# Patient Record
Sex: MALE | Race: WHITE | Employment: UNEMPLOYED | URBAN - METROPOLITAN AREA
[De-identification: names, ages, dates, MRNs, and addresses within clinical notes are randomized per-mention and may not be internally consistent; named-entity substitution may affect disease eponyms.]

---

## 2019-01-03 ENCOUNTER — OFFICE VISIT (OUTPATIENT)
Dept: FAMILY MEDICINE CLINIC | Facility: CLINIC | Age: 49
End: 2019-01-03

## 2019-01-03 VITALS
RESPIRATION RATE: 16 BRPM | WEIGHT: 247 LBS | DIASTOLIC BLOOD PRESSURE: 104 MMHG | HEART RATE: 75 BPM | SYSTOLIC BLOOD PRESSURE: 171 MMHG | BODY MASS INDEX: 36.58 KG/M2 | OXYGEN SATURATION: 96 % | HEIGHT: 69 IN | TEMPERATURE: 95.9 F

## 2019-01-03 DIAGNOSIS — M13.0 POLYARTICULAR ARTHRITIS: ICD-10-CM

## 2019-01-03 DIAGNOSIS — M79.672 PAIN IN BOTH FEET: ICD-10-CM

## 2019-01-03 DIAGNOSIS — M79.671 PAIN IN BOTH FEET: ICD-10-CM

## 2019-01-03 DIAGNOSIS — I10 ESSENTIAL HYPERTENSION: ICD-10-CM

## 2019-01-03 DIAGNOSIS — L40.9 PSORIASIS: ICD-10-CM

## 2019-01-03 DIAGNOSIS — F17.200 CURRENT SMOKER: Primary | ICD-10-CM

## 2019-01-03 PROBLEM — E66.01 SEVERE OBESITY (HCC): Status: ACTIVE | Noted: 2019-01-03

## 2019-01-03 RX ORDER — MELOXICAM 7.5 MG/1
TABLET ORAL
Qty: 60 TAB | Refills: 2 | Status: SHIPPED | OUTPATIENT
Start: 2019-01-03 | End: 2021-04-26 | Stop reason: ALTCHOICE

## 2019-01-03 NOTE — PROGRESS NOTES
HISTORY OF PRESENT ILLNESS  Antolin Pitt is a 50 y.o. male. He presents to establish care for joint pain and psoriasis  . New Patient   Pertinent negatives include no chest pain, no abdominal pain, no headaches and no shortness of breath. Foot Pain   The history is provided by the patient (This is a 50year old male who presents with bilateral heel pain onset one year prior  Also noted on the top of the foot and knees. The pain is constant worse when walking on the area. No trouble urinating. He is also now having hip described as aching whe). This is a chronic (The patient has not seen a doctor for over a decade. He came in once he became insured, he states) problem. The problem occurs constantly. The problem has been gradually worsening. Pertinent negatives include no chest pain, no abdominal pain, no headaches and no shortness of breath. The symptoms are aggravated by walking, standing and twisting. Nothing relieves the symptoms. Skin Problem   The history is provided by the patient (Psoriatic arthritis. History of prior koebner phenomenon in areas of damage). This is a chronic problem. The problem occurs constantly. Pertinent negatives include no chest pain, no abdominal pain, no headaches and no shortness of breath. Nothing (Not on treatment at this time) aggravates the symptoms. Nothing relieves the symptoms. Hypertension    History provided by: Elevated blood pressure. The patient was unaware of this finding. This is a new problem. Associated symptoms include anxiety (pressured speech ). Pertinent negatives include no chest pain, no blurred vision, no headaches, no tinnitus, no dizziness and no shortness of breath. Agents associated with hypertension include NSAIDs. Risk factors include smoking/tobacco exposure.        Review of Systems   Constitutional:        The patient denies any fever, chills, night sweats or weight loss  There has been no diaphoresis malaise or weakness   HENT: Negative for congestion, ear pain, hearing loss, sore throat and tinnitus. Eyes: Negative for blurred vision, double vision, photophobia and pain. Respiratory: Negative for sputum production and shortness of breath. The patient denies any shortness of breath at rest  There is no dyspnea on exertion  The patient denies any cough, or wheezing   Cardiovascular: Negative for chest pain. The patient denies any chest pain pressure or palpitations  There is no history of orthopnea or PND. The patient denies chest pain or dyspnea on exertion  There is no history of leg pain on walking  There is no history of pallor   Gastrointestinal: Negative for abdominal pain, blood in stool and melena. There is no history of nausea  The patient denies vomiting  There is no history of diarrhea or constipation  The patient denies heartburn     Genitourinary: Positive for dysuria (mild urinary retention). Musculoskeletal: Positive for back pain, joint pain and myalgias. Negative for falls. Skin: Positive for itching and rash. Neurological: Negative for dizziness, tingling, speech change, seizures and headaches. The patient denies numbness, tingling or other changes in sensation  There is no history of focal weakness, paresis or paralysis  There is no history of LOC    Endo/Heme/Allergies: Negative for polydipsia. Psychiatric/Behavioral: Positive for depression. Negative for substance abuse and suicidal ideas. The patient is nervous/anxious. The patient does not have insomnia. Physical Exam   Constitutional: He is oriented to person, place, and time. He appears well-developed and well-nourished. HENT:   Head: Normocephalic and atraumatic. Right Ear: External ear normal.   Left Ear: External ear normal.   Nose: Nose normal.   Mouth/Throat: Oropharynx is clear and moist.   Eyes: Conjunctivae and EOM are normal. Pupils are equal, round, and reactive to light.  Right eye exhibits no discharge. No scleral icterus. Neck: Normal range of motion. Neck supple. No JVD present. No thyromegaly present. Cardiovascular: Normal rate, regular rhythm and intact distal pulses. Exam reveals no gallop and no friction rub. No murmur heard. Pulmonary/Chest: No respiratory distress. He has no wheezes. He has no rales. He exhibits no tenderness. Abdominal: He exhibits no distension and no mass. There is no tenderness. There is no rebound and no guarding. Musculoskeletal: Normal range of motion. He exhibits no edema, tenderness or deformity. Lymphadenopathy:     He has no cervical adenopathy. Neurological: He is alert and oriented to person, place, and time. No cranial nerve deficit. He exhibits normal muscle tone. Coordination normal.   Skin: Skin is warm and dry. Rash noted. There is erythema. Diffuse psoriasis like plaques, on the trunk and extremities  Evidence of damage right forearm  Pitted nails   Psychiatric: His behavior is normal. Judgment and thought content normal.   Anxious. Pressured speech , repeats   Nursing note and vitals reviewed. ASSESSMENT and PLAN    ICD-10-CM ICD-9-CM    1. Current smokerChronic F17.200 305.1     Advised to stop. Shared literature with him   2. Pain in both feet M79.671 729.5 CBC WITH AUTOMATED DIFF    S07.488  METABOLIC PANEL, BASIC      SED RATE (ESR)      RHEUMATOID FACTOR, FLUID      CRP, HIGH SENSITIVITY      RADHA COMPREHENSIVE PANEL      RHEUMASSURE    Chronic problem. not improving  DDx DJD,psoriatic or other connective tissue problem, [place]ers, other  Diagnostic workup initiated  Mobic for now for pain    3. Polyarticular arthritisChronic M13.0 716.50 CBC WITH AUTOMATED DIFF      METABOLIC PANEL, BASIC      SED RATE (ESR)      RHEUMATOID FACTOR, FLUID      CRP, HIGH SENSITIVITY      RADHA COMPREHENSIVE PANEL      RHEUMASSURE    Workup initiated,may require DMARD   may need specialist referral   4.  Essential hypertension I10 401.9     Patient unaware of prior elevated readings  Repeat in 2 weeks prior to deciding on medication  administration   5. PsoriasisChronic L40.9 696.1 CBC WITH AUTOMATED DIFF      METABOLIC PANEL, BASIC      SED RATE (ESR)      RHEUMATOID FACTOR, FLUID      CRP, HIGH SENSITIVITY      RADHA COMPREHENSIVE PANEL      RHEUMASSURE    Widespread Once ready will have him use petrolium jelly post bath  and start with a steroid cream.   Other treatments may be needed. Follow-up Disposition:  Return in about 1 month (around 2/3/2019), or if symptoms worsen or fail to improve, for Have BP checked in 2 weeks here. lab results and schedule of future lab studies reviewed with patient  very strongly urged to quit smoking to reduce cardiovascular risk  reviewed medications and side effects in detail  Labs have been ordered. Mobic ordered  May need other meds for the arthritis  Will need treatment for the psoriasis once labs have returned

## 2019-01-03 NOTE — PATIENT INSTRUCTIONS
Capsaicin (On the skin)   Capsaicin (florian-SAY-sin)  Helps relieve muscle, joint, and nerve pain. Brand Name(s): Arthricare For Women, Arthricare For Women Silky Dry, Arthritis Pain Relieving Cream, Castiva Arthritis Pain Relief, DermaSilkRx Anodynexa Flaco, DermaSilkRx [employer], DermacinRx [facility], DermacinRx Lexitral PharmaPak, Diclofex DC, Inflammacin, Metaxall CP, NuDiclo SoluPAK, NuDiclo TabPAK, PrevidolRx Analgesic Flaco, PrevidolRx Plus Analgesic Flaco   There may be other brand names for this medicine. When This Medicine Should Not Be Used: This medicine is not right for everyone. Do not use it if you had an allergic reaction to capsaicin. How to Use This Medicine:   Cream, Thin Sheet, Gel/Jelly, Liquid, Lotion, Ointment, Pad, Patch, Stick  · Use this medicine only on your skin. Rinse it off right away if it gets on a cut or scrape. Do not get the medicine in your eyes, nose, or mouth. · Cream, lotion, ointment, gel, or liquid:   ¨ Follow the instructions on the medicine label if you are using this medicine without a prescription. ¨ Wash your hands with soap and water before and after you use this medicine. If you are using capsaicin for arthritis in your hands, do not wash your hands for at least 30 minutes after you apply it. Do not touch sensitive areas such as your eyes or mouth while the medicine is on your hands. ¨ Shake the lotion bottle well before use. ¨ Apply a small amount of the cream, lotion, ointment, gel, or liquid medicine over the affected area. Rub it in until you cannot see any medicine on your skin. ¨ You may feel burning or stinging each time you rub in the medicine. This may last for 2 to 4 weeks. Continue to use full doses. The burning sensation will not improve or go away if you reduce the number of doses you use each day. ¨ If a bandage is being used on the treated area, do not wrap it tightly.   · Gurinder Slater patch:   ¨ A nurse or other trained health professional will give you this medicine. ¨ Use the patch as directed. Do not touch the patch while it is on your skin. · Missed dose: Apply a dose as soon as you can. If it is almost time for your next dose, wait until then and apply a regular dose. Do not apply extra medicine to make up for a missed dose. · Store the medicine in a closed container at room temperature, away from heat, moisture, and direct light. Drugs and Foods to Avoid:   Ask your doctor or pharmacist before using any other medicine, including over-the-counter medicines, vitamins, and herbal products. · Talk to your doctor before you use any other skin medicine on the same area where you are using capsaicin. Warnings While Using This Medicine:   · Tell your doctor if you are pregnant or breastfeeding, or if you have high blood pressure or a history of heart or blood vessel problems. · Do not use the medicine on open wounds, sores, scrapes, or irritated skin. · This medicine may make your skin more sensitive to sunlight. Wear sunscreen. Do not use sunlamps or tanning beds. · Do not use this medicine to treat a skin problem your doctor has not examined. · Your doctor will check your progress and the effects of this medicine at regular visits. Keep all appointments. Your blood pressure will be measured while the patch is on your skin and after it has been removed. If you check your blood pressure at home and notice any changes, call your doctor right away. · You may need to use the medicine for 2 weeks or more before it relieves your pain. Keep using the medicine every day. If the medicine has not helped after a month, or if your pain becomes worse after a week, talk with your doctor. · Keep all medicine out of the reach of children. Never share your medicine with anyone.   Possible Side Effects While Using This Medicine:   Call your doctor right away if you notice any of these side effects:  · Allergic reaction: Itching or hives, swelling in your face or hands, swelling or tingling in your mouth or throat, chest tightness, trouble breathing  · Burning or stinging sensation  · Coughing, shortness of breath, or any breathing problems  · Fast, pounding, or uneven heartbeat  · Severe skin irritation, pain, redness, or swelling  If you notice these less serious side effects, talk with your doctor:   · Nausea or vomiting  · Skin itching or dryness  If you notice other side effects that you think are caused by this medicine, tell your doctor. Call your doctor for medical advice about side effects. You may report side effects to FDA at 0-556-BMV-4491  © 2017 [facility] Information is for End User's use only and may not be sold, redistributed or otherwise used for commercial purposes. The above information is an  only. It is not intended as medical advice for individual conditions or treatments. Talk to your doctor, nurse or pharmacist before following any medical regimen to see if it is safe and effective for you. Capsaicin (On the skin)   Capsaicin (florian-SAY-sin)  Helps relieve muscle, joint, and nerve pain. Brand Name(s): Arthricare For Women, Arthricare For Women Silky Dry, Arthritis Pain Relieving Cream, Castiva Arthritis Pain Relief, DermaSilkRx Anodynexa Flaco, DermaSilkRx [employer], DermacinRx [facility], DermacinRx Lexitral PharmaPak, Diclofex DC, Inflammacin, Metaxall CP, NuDiclo SoluPAK, NuDiclo TabPAK, PrevidolRx Analgesic Flaco, PrevidolRx Plus Analgesic Flaco   There may be other brand names for this medicine. When This Medicine Should Not Be Used: This medicine is not right for everyone. Do not use it if you had an allergic reaction to capsaicin. How to Use This Medicine:   Cream, Thin Sheet, Gel/Jelly, Liquid, Lotion, Ointment, Pad, Patch, Stick  · Use this medicine only on your skin. Rinse it off right away if it gets on a cut or scrape. Do not get the medicine in your eyes, nose, or mouth.   · Cream, lotion, ointment, gel, or liquid:   ¨ Follow the instructions on the medicine label if you are using this medicine without a prescription. ¨ Wash your hands with soap and water before and after you use this medicine. If you are using capsaicin for arthritis in your hands, do not wash your hands for at least 30 minutes after you apply it. Do not touch sensitive areas such as your eyes or mouth while the medicine is on your hands. ¨ Shake the lotion bottle well before use. ¨ Apply a small amount of the cream, lotion, ointment, gel, or liquid medicine over the affected area. Rub it in until you cannot see any medicine on your skin. ¨ You may feel burning or stinging each time you rub in the medicine. This may last for 2 to 4 weeks. Continue to use full doses. The burning sensation will not improve or go away if you reduce the number of doses you use each day. ¨ If a bandage is being used on the treated area, do not wrap it tightly. · Man Mu patch:   ¨ A nurse or other trained health professional will give you this medicine. ¨ Use the patch as directed. Do not touch the patch while it is on your skin. · Missed dose: Apply a dose as soon as you can. If it is almost time for your next dose, wait until then and apply a regular dose. Do not apply extra medicine to make up for a missed dose. · Store the medicine in a closed container at room temperature, away from heat, moisture, and direct light. Drugs and Foods to Avoid:   Ask your doctor or pharmacist before using any other medicine, including over-the-counter medicines, vitamins, and herbal products. · Talk to your doctor before you use any other skin medicine on the same area where you are using capsaicin. Warnings While Using This Medicine:   · Tell your doctor if you are pregnant or breastfeeding, or if you have high blood pressure or a history of heart or blood vessel problems. · Do not use the medicine on open wounds, sores, scrapes, or irritated skin.   · This medicine may make your skin more sensitive to sunlight. Wear sunscreen. Do not use sunlamps or tanning beds. · Do not use this medicine to treat a skin problem your doctor has not examined. · Your doctor will check your progress and the effects of this medicine at regular visits. Keep all appointments. Your blood pressure will be measured while the patch is on your skin and after it has been removed. If you check your blood pressure at home and notice any changes, call your doctor right away. · You may need to use the medicine for 2 weeks or more before it relieves your pain. Keep using the medicine every day. If the medicine has not helped after a month, or if your pain becomes worse after a week, talk with your doctor. · Keep all medicine out of the reach of children. Never share your medicine with anyone. Possible Side Effects While Using This Medicine:   Call your doctor right away if you notice any of these side effects:  · Allergic reaction: Itching or hives, swelling in your face or hands, swelling or tingling in your mouth or throat, chest tightness, trouble breathing  · Burning or stinging sensation  · Coughing, shortness of breath, or any breathing problems  · Fast, pounding, or uneven heartbeat  · Severe skin irritation, pain, redness, or swelling  If you notice these less serious side effects, talk with your doctor:   · Nausea or vomiting  · Skin itching or dryness  If you notice other side effects that you think are caused by this medicine, tell your doctor. Call your doctor for medical advice about side effects. You may report side effects to FDA at 3-489-FDA-7428  © 2017 [facility] Information is for End User's use only and may not be sold, redistributed or otherwise used for commercial purposes. The above information is an  only. It is not intended as medical advice for individual conditions or treatments.  Talk to your doctor, nurse or pharmacist before following any medical regimen to see if it is safe and effective for you.

## 2019-01-04 ENCOUNTER — DOCUMENTATION ONLY (OUTPATIENT)
Dept: FAMILY MEDICINE CLINIC | Facility: CLINIC | Age: 49
End: 2019-01-04

## 2019-01-04 NOTE — PROGRESS NOTES
HISTORY OF PRESENT ILLNESS Loren Chan is a 50 y.o. male. Told  Patient not to double up medication and if he is still in pain to go to the ER. ROS Physical Exam 
 
ASSESSMENT and PLAN 
{ASSESSMENT/PLAN:83266}

## 2019-01-11 ENCOUNTER — HOSPITAL ENCOUNTER (OUTPATIENT)
Dept: LAB | Age: 49
Discharge: HOME | End: 2019-01-11

## 2019-01-11 ENCOUNTER — OFFICE VISIT (OUTPATIENT)
Dept: FAMILY MEDICINE CLINIC | Facility: CLINIC | Age: 49
End: 2019-01-11

## 2019-01-11 VITALS
RESPIRATION RATE: 20 BRPM | OXYGEN SATURATION: 96 % | HEART RATE: 69 BPM | SYSTOLIC BLOOD PRESSURE: 143 MMHG | BODY MASS INDEX: 34.51 KG/M2 | DIASTOLIC BLOOD PRESSURE: 96 MMHG | TEMPERATURE: 97 F | HEIGHT: 69 IN | WEIGHT: 233 LBS

## 2019-01-11 DIAGNOSIS — M13.0 POLYARTICULAR ARTHRITIS: ICD-10-CM

## 2019-01-11 DIAGNOSIS — I10 ESSENTIAL HYPERTENSION: ICD-10-CM

## 2019-01-11 DIAGNOSIS — M79.672 PAIN IN BOTH FEET: Primary | ICD-10-CM

## 2019-01-11 DIAGNOSIS — R10.31 RIGHT LOWER QUADRANT ABDOMINAL PAIN: ICD-10-CM

## 2019-01-11 DIAGNOSIS — L40.9 PSORIASIS: ICD-10-CM

## 2019-01-11 DIAGNOSIS — F17.200 CURRENT SMOKER: ICD-10-CM

## 2019-01-11 DIAGNOSIS — M79.671 PAIN IN BOTH FEET: Primary | ICD-10-CM

## 2019-01-11 LAB — SENTARA SPECIMEN COL,SENBCF: NORMAL

## 2019-01-11 PROCEDURE — 99001 SPECIMEN HANDLING PT-LAB: CPT

## 2019-01-11 RX ORDER — GABAPENTIN 100 MG/1
CAPSULE ORAL
Qty: 60 CAP | Refills: 1 | Status: SHIPPED | OUTPATIENT
Start: 2019-01-11

## 2019-01-11 RX ORDER — AMLODIPINE BESYLATE 5 MG/1
5 TABLET ORAL DAILY
Qty: 30 TAB | Refills: 3 | Status: SHIPPED | OUTPATIENT
Start: 2019-01-11

## 2019-01-11 NOTE — PROGRESS NOTES
HISTORY OF PRESENT ILLNESS  Antolin Prado is a 50 y.o. male. 01/11/19  3:21 PM  This is a 50 y.o. male Patient presents with: Follow-up: Chronic pain pt states \" that I medication was given is not helping \"    HPI Comments:   Mr Luigi Ibrahim presents with continued pain in both feet. The patient states the pain is a little better. It is in the feet and goes to the legs bilaterally. He states that he doubled his dose of Mobic and was also taking Aleve , up to 4 200mg doses twice a day at the same time  He says that he was given a medication from a friend several days ago that helped. He does not know the name of that medication He had called the office several times because of the pain and was asked to go to the ED if it was severe, which he elected not to do  He did not  lab tests, but plans to obtain them today. He has had no fever or chills    He has been on Stelara before details not given    He is a smoker less than one pack per day for greater than 20 years    The patient does drink alcohol, last episode the prior night. Follow-up   Pertinent negatives include no chest pain, no abdominal pain, no headaches and no shortness of breath. Review of Systems   Constitutional:        The patient denies any fever, chills, night sweats or weight loss  There has been no diaphoresis malaise or weakness   HENT: Negative for congestion, ear pain, hearing loss, sore throat and tinnitus. Eyes: Negative for blurred vision, double vision, photophobia and pain. Respiratory: Negative for sputum production and shortness of breath. The patient denies any shortness of breath at rest  There is no dyspnea on exertion  The patient denies any cough, or wheezing   Cardiovascular: Negative for chest pain and palpitations. The patient denies any chest pain pressure or palpitations  There is no history of orthopnea or PND.   The patient denies chest pain or dyspnea on exertion  There is no history of leg pain on walking  There is no history of pallor   Gastrointestinal: Negative for abdominal pain, blood in stool, diarrhea, heartburn, melena, nausea and vomiting. There is no history of nausea  The patient denies vomiting  There is no history of diarrhea or constipation  The patient denies heartburn     Genitourinary: Negative for dysuria (mild urinary retention). Musculoskeletal: Positive for back pain, joint pain and myalgias. Negative for falls. Skin: Positive for itching and rash. Neurological: Negative for dizziness, tingling, speech change, seizures and headaches. The patient denies numbness, tingling or other changes in sensation  There is no history of focal weakness, paresis or paralysis  There is no history of LOC    Endo/Heme/Allergies: Negative for polydipsia. Psychiatric/Behavioral: Positive for depression. Negative for substance abuse and suicidal ideas. The patient is nervous/anxious. The patient does not have insomnia. Physical Exam   Constitutional: He is oriented to person, place, and time. He appears well-developed and well-nourished. HENT:   Head: Normocephalic and atraumatic. Right Ear: External ear normal.   Left Ear: External ear normal.   Nose: Nose normal.   Mouth/Throat: Oropharynx is clear and moist.   Eyes: Conjunctivae and EOM are normal. Pupils are equal, round, and reactive to light. Right eye exhibits no discharge. No scleral icterus. Neck: Normal range of motion. Neck supple. No JVD present. No thyromegaly present. Cardiovascular: Normal rate, regular rhythm and intact distal pulses. Exam reveals no gallop and no friction rub. No murmur heard. Pulmonary/Chest: No respiratory distress. He has no wheezes. He has no rales. He exhibits no tenderness. Abdominal: He exhibits no distension and no mass. There is no tenderness. There is no rebound and no guarding. Musculoskeletal: Normal range of motion. He exhibits no edema.    He did not allow me to see his feet today stating that they look the same. He is aware of the risks benefits and alternatives   Lymphadenopathy:     He has no cervical adenopathy. Neurological: He is alert and oriented to person, place, and time. No cranial nerve deficit. He exhibits normal muscle tone. Coordination normal.   Skin: Skin is warm and dry. Rash noted. Diffuse psoriatic plaques noted   Psychiatric: He has a normal mood and affect. His behavior is normal. Judgment and thought content normal.   High anxiety, anger and frustration over his condition   Nursing note and vitals reviewed. ASSESSMENT and PLAN    ICD-10-CM ICD-9-CM    1. Pain in both feet M79.671 729.5     M79.672      Neurontin added, to be titrated  Strongly encouraged to take no more than Naprosyn 500 BiD   2. Polyarticular arthritis M13.0 716.50    3. Essential hypertension I10 401.9 amLODIPine (NORVASC) 5 mg tablet    Norvasc initiated   4. Current smoker F17.200 305.1     Advised to cut down again   5. Psoriasis L40.9 696.1     He does not want the creams  Will begin oral treatment once cleared labs   6. Right lower quadrant abdominal pain R10.31 789.03 HEPATIC FUNCTION PANEL    Mild, long term, labs ordered     Follow-up Disposition:  Return in about 1 month (around 2/11/2019).   very strongly urged to quit smoking to reduce cardiovascular risk

## 2019-01-11 NOTE — PROGRESS NOTES
Chief Complaint   Patient presents with    Follow-up     Chronic pain pt states \" that medication was given is not helping \"

## 2019-01-12 LAB
ABSOLUTE LYMPHOCYTE COUNT, 10803: 2.4 K/UL (ref 1–4.8)
ANION GAP SERPL CALC-SCNC: 18 MMOL/L
BASOPHILS # BLD: 0 K/UL (ref 0–0.2)
BASOPHILS NFR BLD: 1 % (ref 0–2)
BUN SERPL-MCNC: 21 MG/DL (ref 6–22)
C-REACTIVE PROTEIN, QT, 006627: 0.6 MG/DL (ref 0–0.5)
CALCIUM SERPL-MCNC: 10 MG/DL (ref 8.4–10.4)
CHLORIDE SERPL-SCNC: 98 MMOL/L (ref 98–110)
CO2 SERPL-SCNC: 24 MMOL/L (ref 20–32)
CREAT SERPL-MCNC: 1.1 MG/DL (ref 0.5–1.2)
EOSINOPHIL # BLD: 0.2 K/UL (ref 0–0.5)
EOSINOPHIL NFR BLD: 2 % (ref 0–6)
ERYTHROCYTE [DISTWIDTH] IN BLOOD BY AUTOMATED COUNT: 13.7 % (ref 10–15.5)
GFRAA, 66117: >60
GFRNA, 66118: >60
GLUCOSE SERPL-MCNC: 84 MG/DL (ref 70–99)
GRANULOCYTES,GRANS: 60 % (ref 40–75)
HCT VFR BLD AUTO: 50 % (ref 39.3–51.6)
HGB BLD-MCNC: 15.8 G/DL (ref 13.1–17.2)
LYMPHOCYTES, LYMLT: 28 % (ref 20–45)
MCH RBC QN AUTO: 32 PG (ref 26–34)
MCHC RBC AUTO-ENTMCNC: 32 G/DL (ref 31–36)
MCV RBC AUTO: 102 FL (ref 80–95)
MONOCYTES # BLD: 0.9 K/UL (ref 0.1–1)
MONOCYTES NFR BLD: 10 % (ref 3–12)
NEUTROPHILS # BLD AUTO: 5.2 K/UL (ref 1.8–7.7)
PLATELET # BLD AUTO: 283 K/UL (ref 140–440)
PMV BLD AUTO: 10.9 FL (ref 9–13)
POTASSIUM SERPL-SCNC: 4.6 MMOL/L (ref 3.5–5.5)
RBC # BLD AUTO: 4.92 M/UL (ref 3.8–5.8)
RHEUMATOID FACTOR QUANT, IMMUNOTURBIDIMETRIC: <20 IU/ML (ref 0–20)
SED RATE (ESR): 22 MM/HR (ref 0–15)
SODIUM SERPL-SCNC: 140 MMOL/L (ref 133–145)
WBC # BLD AUTO: 8.7 K/UL (ref 4–11)

## 2019-01-13 LAB
ANTI-DNA (DS) AB QN, 1189: 2 IU/ML
CENTROMERE B ANTIBODY, 601143: <0.2 AI
CHROMATIN ANTIBODY: <0.2 AI
ENA SS-A AB SER-ACNC: <0.2 AI
ENA SS-B AB SER-ACNC: <0.2 AI
JO1 ANTIBODY, 8107: <0.2 AI
RNP ABS, 016354: <0.2 AI
SCLERODERMA AB (SCL-70), 601116: <0.2 AI
SMITH ABS, 016362: <0.2 AI

## 2019-01-14 DIAGNOSIS — L40.9 PSORIASIS: Primary | ICD-10-CM

## 2019-01-14 DIAGNOSIS — M79.672 FOOT PAIN, BILATERAL: ICD-10-CM

## 2019-01-14 DIAGNOSIS — M79.671 FOOT PAIN, BILATERAL: ICD-10-CM

## 2019-01-14 NOTE — PROGRESS NOTES
Please call the labs for other connective tissue arthritis causes were negative.   Will refer him to a rheumatologist and dermatologist

## 2019-01-22 ENCOUNTER — DOCUMENTATION ONLY (OUTPATIENT)
Dept: FAMILY MEDICINE CLINIC | Facility: CLINIC | Age: 49
End: 2019-01-22

## 2019-01-22 ENCOUNTER — TELEPHONE (OUTPATIENT)
Dept: FAMILY MEDICINE CLINIC | Facility: CLINIC | Age: 49
End: 2019-01-22

## 2019-01-22 NOTE — TELEPHONE ENCOUNTER
Pt called back requesting all his records sent to [facility], told him I can not send records without a release form that can be signed in this office or her office. He said he would sign one at her office.

## 2019-01-22 NOTE — PROGRESS NOTES
HISTORY OF PRESENT ILLNESS Declan Calderon is a 50 y.o. male. HPI 
 
ROS Physical Exam 
 
ASSESSMENT and PLAN 
{ASSESSMENT/PLAN:68919}

## 2019-01-22 NOTE — TELEPHONE ENCOUNTER
Pt called today stating he wanted to know if the Doctor is going to prescribe him something for pain or sending him to another doctor. He then stated if Dr. Fiona Pearl is not going to help him he would find another doctor. As he was yelling at me on the phone my coworker ask me to put him on hold and she would handle the rest of the call.

## 2019-04-22 ENCOUNTER — HOSPITAL ENCOUNTER (EMERGENCY)
Age: 49
Discharge: HOME | End: 2019-04-22
Attending: EMERGENCY MEDICINE
Payer: MEDICAID

## 2019-04-22 ENCOUNTER — APPOINTMENT (OUTPATIENT)
Dept: GENERAL RADIOLOGY | Age: 49
End: 2019-04-22
Attending: EMERGENCY MEDICINE
Payer: MEDICAID

## 2019-04-22 VITALS
RESPIRATION RATE: 16 BRPM | BODY MASS INDEX: 34.86 KG/M2 | WEIGHT: 230 LBS | TEMPERATURE: 97.5 F | HEART RATE: 72 BPM | DIASTOLIC BLOOD PRESSURE: 84 MMHG | SYSTOLIC BLOOD PRESSURE: 140 MMHG | HEIGHT: 68 IN | OXYGEN SATURATION: 98 %

## 2019-04-22 DIAGNOSIS — M25.572 CHRONIC PAIN OF LEFT ANKLE: Primary | ICD-10-CM

## 2019-04-22 DIAGNOSIS — G89.29 CHRONIC PAIN OF LEFT ANKLE: Primary | ICD-10-CM

## 2019-04-22 DIAGNOSIS — E79.0 ELEVATED BLOOD URIC ACID LEVEL: ICD-10-CM

## 2019-04-22 DIAGNOSIS — L40.0 PLAQUE PSORIASIS: ICD-10-CM

## 2019-04-22 LAB
ANION GAP SERPL CALC-SCNC: 7 MMOL/L (ref 3–18)
BASOPHILS # BLD: 0 K/UL (ref 0–0.1)
BASOPHILS NFR BLD: 0 % (ref 0–2)
BUN SERPL-MCNC: 13 MG/DL (ref 7–18)
BUN/CREAT SERPL: 12 (ref 12–20)
CALCIUM SERPL-MCNC: 9.3 MG/DL (ref 8.5–10.1)
CHLORIDE SERPL-SCNC: 106 MMOL/L (ref 100–108)
CO2 SERPL-SCNC: 28 MMOL/L (ref 21–32)
CREAT SERPL-MCNC: 1.09 MG/DL (ref 0.6–1.3)
DIFFERENTIAL METHOD BLD: ABNORMAL
EOSINOPHIL # BLD: 0.1 K/UL (ref 0–0.4)
EOSINOPHIL NFR BLD: 0 % (ref 0–5)
ERYTHROCYTE [DISTWIDTH] IN BLOOD BY AUTOMATED COUNT: 13.6 % (ref 11.6–14.5)
GLUCOSE SERPL-MCNC: 82 MG/DL (ref 74–99)
HCT VFR BLD AUTO: 45.7 % (ref 36–48)
HGB BLD-MCNC: 15.3 G/DL (ref 13–16)
LYMPHOCYTES # BLD: 1.6 K/UL (ref 0.9–3.6)
LYMPHOCYTES NFR BLD: 10 % (ref 21–52)
MCH RBC QN AUTO: 32.8 PG (ref 24–34)
MCHC RBC AUTO-ENTMCNC: 33.5 G/DL (ref 31–37)
MCV RBC AUTO: 97.9 FL (ref 74–97)
MONOCYTES # BLD: 1.3 K/UL (ref 0.05–1.2)
MONOCYTES NFR BLD: 8 % (ref 3–10)
NEUTS SEG # BLD: 12.6 K/UL (ref 1.8–8)
NEUTS SEG NFR BLD: 82 % (ref 40–73)
PLATELET # BLD AUTO: 263 K/UL (ref 135–420)
PMV BLD AUTO: 10.3 FL (ref 9.2–11.8)
POTASSIUM SERPL-SCNC: 3.9 MMOL/L (ref 3.5–5.5)
RBC # BLD AUTO: 4.67 M/UL (ref 4.7–5.5)
SODIUM SERPL-SCNC: 141 MMOL/L (ref 136–145)
URATE SERPL-MCNC: 7.4 MG/DL (ref 2.6–7.2)
WBC # BLD AUTO: 15.6 K/UL (ref 4.6–13.2)

## 2019-04-22 PROCEDURE — 73610 X-RAY EXAM OF ANKLE: CPT

## 2019-04-22 PROCEDURE — 74011250637 HC RX REV CODE- 250/637: Performed by: EMERGENCY MEDICINE

## 2019-04-22 PROCEDURE — 84550 ASSAY OF BLOOD/URIC ACID: CPT

## 2019-04-22 PROCEDURE — 99284 EMERGENCY DEPT VISIT MOD MDM: CPT

## 2019-04-22 PROCEDURE — 85025 COMPLETE CBC W/AUTO DIFF WBC: CPT

## 2019-04-22 PROCEDURE — 80048 BASIC METABOLIC PNL TOTAL CA: CPT

## 2019-04-22 RX ORDER — INDOMETHACIN 25 MG/1
50 CAPSULE ORAL 3 TIMES DAILY
Qty: 60 CAP | Refills: 0 | Status: SHIPPED | OUTPATIENT
Start: 2019-04-22 | End: 2021-04-26 | Stop reason: ALTCHOICE

## 2019-04-22 RX ORDER — HYDROCODONE BITARTRATE AND ACETAMINOPHEN 5; 325 MG/1; MG/1
1 TABLET ORAL
Qty: 12 TAB | Refills: 0 | Status: SHIPPED | OUTPATIENT
Start: 2019-04-22 | End: 2019-04-25

## 2019-04-22 RX ORDER — OXYCODONE AND ACETAMINOPHEN 5; 325 MG/1; MG/1
1 TABLET ORAL
Status: COMPLETED | OUTPATIENT
Start: 2019-04-22 | End: 2019-04-22

## 2019-04-22 RX ORDER — COLCHICINE 0.6 MG/1
TABLET ORAL
Qty: 30 TAB | Refills: 0 | Status: SHIPPED | OUTPATIENT
Start: 2019-04-22

## 2019-04-22 RX ORDER — HYDROCODONE BITARTRATE AND ACETAMINOPHEN 5; 325 MG/1; MG/1
1 TABLET ORAL
Status: COMPLETED | OUTPATIENT
Start: 2019-04-22 | End: 2019-04-22

## 2019-04-22 RX ADMIN — OXYCODONE HYDROCHLORIDE AND ACETAMINOPHEN 1 TABLET: 5; 325 TABLET ORAL at 07:46

## 2019-04-22 RX ADMIN — HYDROCODONE BITARTRATE AND ACETAMINOPHEN 1 TABLET: 5; 325 TABLET ORAL at 09:34

## 2019-04-22 NOTE — DISCHARGE INSTRUCTIONS
SPECIFIC PATIENT INSTRUCTIONS FROM THE PHYSICIAN WHO TREATED YOU IN THE ER TODAY:  1. Return if worse. 2. Colchicine and indomethacin as prescribed. 3. IF Norco was prescribed, take if for pain not controlled with the other prescribed medications. 4. Reevaluation by your doctor in next 2-3 days if not improving. Patient Education        Joint Pain: Care Instructions  Your Care Instructions    Many people have small aches and pains from overuse or injury to muscles and joints. Joint injuries often happen during sports or recreation, work tasks, or projects around the home. An overuse injury can happen when you put too much stress on a joint or when you do an activity that stresses the joint over and over, such as using the computer or rowing a boat. You can take action at home to help your muscles and joints get better. You should feel better in 1 to 2 weeks, but it can take 3 months or more to heal completely. Follow-up care is a key part of your treatment and safety. Be sure to make and go to all appointments, and call your doctor if you are having problems. It's also a good idea to know your test results and keep a list of the medicines you take. How can you care for yourself at home? · Do not put weight on the injured joint for at least a day or two. · For the first day or two after an injury, do not take hot showers or baths, and do not use hot packs. The heat could make swelling worse. · Put ice or a cold pack on the sore joint for 10 to 20 minutes at a time. Try to do this every 1 to 2 hours for the next 3 days (when you are awake) or until the swelling goes down. Put a thin cloth between the ice and your skin. · Wrap the injury in an elastic bandage. Do not wrap it too tightly because this can cause more swelling. · Prop up the sore joint on a pillow when you ice it or anytime you sit or lie down during the next 3 days. Try to keep it above the level of your heart.  This will help reduce swelling. · Take an over-the-counter pain medicine, such as acetaminophen (Tylenol), ibuprofen (Advil, Motrin), or naproxen (Aleve). Read and follow all instructions on the label. · After 1 or 2 days of rest, begin moving the joint gently. While the joint is still healing, you can begin to exercise using activities that do not strain or hurt the painful joint. When should you call for help? Call your doctor now or seek immediate medical care if:    · You have signs of infection, such as:  ? Increased pain, swelling, warmth, and redness. ? Red streaks leading from the joint. ? A fever.    Watch closely for changes in your health, and be sure to contact your doctor if:    · Your movement or symptoms are not getting better after 1 to 2 weeks of home treatment. Where can you learn more? Go to http://alexi-celestino.info/. Enter P205 in the search box to learn more about \"Joint Pain: Care Instructions. \"  Current as of: September 20, 2018  Content Version: 11.9  © 9070-9165 [employer]. Care instructions adapted under license by [employer] (which disclaims liability or warranty for this information). If you have questions about a medical condition or this instruction, always ask your healthcare professional. [address] any warranty or liability for your use of this information. theScore Activation    Thank you for requesting access to theScore. Please follow the instructions below to securely access and download your online medical record. theScore allows you to send messages to your doctor, view your test results, renew your prescriptions, schedule appointments, and more. How Do I Sign Up? 1. In your internet browser, go to [email or web address]. 2. Click on the First Time User? Click Here link in the Sign In box. You will see the New Member Sign Up page. 3. Enter your theScore Access Code exactly as it appears below. You will not need to use this code after youve completed the sign-up process. If you do not sign up before the expiration date, you must request a new code. [facility] Access Code: [email or web address]  Expires: 2019  6:52 AM (This is the date your [facility] access code will )    4. Enter the last four digits of your Social Security Number (xxxx) and Date of Birth (mm/dd/yyyy) as indicated and click Submit. You will be taken to the next sign-up page. 5. Create a [facility] ID. This will be your [facility] login ID and cannot be changed, so think of one that is secure and easy to remember. 6. Create a [facility] password. You can change your password at any time. 7. Enter your Password Reset Question and Answer. This can be used at a later time if you forget your password. 8. Enter your e-mail address. You will receive e-mail notification when new information is available in [address]. 9. Click Sign Up. You can now view and download portions of your medical record. 10. Click the Download Summary menu link to download a portable copy of your medical information. Additional Information    If you have questions, please visit the Frequently Asked Questions section of the [facility] website at [email or web address] Remember, [facility] is NOT to be used for urgent needs. For medical emergencies, dial 911.

## 2019-04-22 NOTE — ED NOTES
Bedside and Verbal shift change report given to Lesa Washington RN (oncoming nurse) by Elizabeth Zee RN (offgoing nurse). Report included the following information SBAR.

## 2019-04-22 NOTE — ED PROVIDER NOTES
[facility] EMERGENCY DEPT 
 
 
8:43 AM 
 
Date: 4/22/2019 Patient Name: Candido Rothman History of Presenting Illness Chief Complaint Patient presents with  Leg Pain 50 y.o. male with noted past medical history who presents to the emergency department with chronic left ankle pain. The patient states that he has had plaque psoriasis for most of his life. He has been noncompliant with his medication for his psoriasis control. In addition, the patient has had some chronic left lower leg pain which is really focused on the left ankle on exam.  He states the pain is been intermittent over the last year that he has been seeing his doctor about it as well. He states that his doctor has given some medicine for but he is run out of the medicine several months ago and is been noncompliant with it. Since then he states the pain is gotten worse. Today the pain was much worse than usual because that he came to the ER for evaluation. Upon initial MD evaluation, the patient is in moderate pain. Patient denies any other associated signs or symptoms. Patient denies any other complaints. Nursing notes regarding the HPI and triage nursing notes were reviewed. Prior medical records were reviewed. Current Outpatient Medications Medication Sig Dispense Refill  colchicine 0.6 mg tablet Take 1 tablet by mouth Q1Hour for gout pain. NOT TO EXCEED 3 TABLETS IN 24 HOURS. 30 Tab 0  
 indomethacin (INDOCIN) 25 mg capsule Take 2 Caps by mouth three (3) times daily. With food 60 Cap 0  
 HYDROcodone-acetaminophen (NORCO) 5-325 mg per tablet Take 1 Tab by mouth every four (4) hours as needed for Pain for up to 3 days. Max Daily Amount: 6 Tabs. If over the counter ibuprofen or acetaminophen was suggested, then only take the vicodin for pain not well controlled with the over the counter medication.  12 Tab 0  
 gabapentin (NEURONTIN) 100 mg capsule Take one at night for 3 nights then take 3 at night 60 Cap 1  
 amLODIPine (NORVASC) 5 mg tablet Take 1 Tab by mouth daily. 30 Tab 3  
 meloxicam (MOBIC) 7.5 mg tablet Take one twice a day 60 Tab 2  
 diazepam (VALIUM) 10 mg tablet Take 1 tablet by mouth every eight (8) hours as needed for Anxiety. 20 tablet 0  
 methylPREDNISolone (MEDROL, BRIT,) 4 mg tablet Per dose pack instructions 1 Package 0  
 cyclobenzaprine (FLEXERIL) 10 mg tablet Take 1 tablet by mouth three (3) times daily as needed for Muscle Spasm(s). 20 tablet 0 Past History Past Medical History: 
Past Medical History:  
Diagnosis Date  Arthritis Past Surgical History: 
History reviewed. No pertinent surgical history. Family History: 
History reviewed. No pertinent family history. Social History: 
Social History Tobacco Use  Smoking status: Current Every Day Smoker Packs/day: 0.25  Smokeless tobacco: Never Used Substance Use Topics  Alcohol use: Yes Comment: Pt states, \"Occasionally, one day a week\"  Drug use: Yes Types: Marijuana Comment: Last smoked marijuana today Allergies: 
No Known Allergies Patient's primary care provider (as noted in EPIC): Tesfaye Chau MD 
 
Review of Systems Visit Vitals /80 Pulse 72 Temp 97.5 °F (36.4 °C) Resp 16 Ht 5' 8\" (1.727 m) Wt 104.3 kg (230 lb) SpO2 99% BMI 34.97 kg/m² PHYSICAL EXAM: 
 
CONSTITUTIONAL:  Alert, in no apparent distress;  well developed;  well nourished. HEAD:  Normocephalic, atraumatic. EYES:  EOMI. Non-icteric sclera. Normal conjunctiva. ENTM:  Nose:  no rhinorrhea. Throat:  no erythema or exudate, mucous membranes moist. 
NECK:  No JVD. Supple RESPIRATORY:  Chest clear, equal breath sounds, good air movement. CARDIOVASCULAR:  Regular rate and rhythm. No murmurs, rubs, or gallops. GI:  Normal bowel sounds, abdomen soft and non-tender. No rebound or guarding. BACK:  Non-tender. UPPER EXT:  Normal inspection. LOWER EXT:  No edema, no calf tenderness. Distal pulses intact. NEURO:  Moves all four extremities, and grossly normal motor exam. 
SKIN:  Normal for age. The large portion of the entire body the patient is covered with elevated plaque psoriasis lesions. PSYCH:  Alert and normal affect. Left ankle exam: No lateral malleolar tenderness to palpation. No medial malleolar tenderness to palpation. No swelling noted. Focal left ankle joint tenderness to palpation but no effusion. Normal foot exam with no base of 5th tenderness to palpation. DIFFERENTIAL DIAGNOSES/ MEDICAL DECISION MAKING: 
Contusion, sprain, dislocation, fracture, ligamentous tear/ disruption or a combination of the above. Diagnostic Study Results Abnormal lab results from this emergency department encounter: 
Labs Reviewed URIC ACID - Abnormal; Notable for the following components:  
    Result Value Uric acid 7.4 (*) All other components within normal limits CBC WITH AUTOMATED DIFF - Abnormal; Notable for the following components: WBC 15.6 (*)   
 RBC 4.67 (*) MCV 97.9 (*) NEUTROPHILS 82 (*) LYMPHOCYTES 10 (*)   
 ABS. NEUTROPHILS 12.6 (*)   
 ABS. MONOCYTES 1.3 (*) All other components within normal limits METABOLIC PANEL, BASIC Lab values for this patient within approximately the last 12 hours: 
Recent Results (from the past 12 hour(s)) URIC ACID Collection Time: 04/22/19  9:15 AM  
Result Value Ref Range Uric acid 7.4 (H) 2.6 - 7.2 MG/DL  
CBC WITH AUTOMATED DIFF Collection Time: 04/22/19  9:15 AM  
Result Value Ref Range WBC 15.6 (H) 4.6 - 13.2 K/uL  
 RBC 4.67 (L) 4.70 - 5.50 M/uL  
 HGB 15.3 13.0 - 16.0 g/dL HCT 45.7 36.0 - 48.0 % MCV 97.9 (H) 74.0 - 97.0 FL  
 MCH 32.8 24.0 - 34.0 PG  
 MCHC 33.5 31.0 - 37.0 g/dL  
 RDW 13.6 11.6 - 14.5 % PLATELET 121 712 - 955 K/uL MPV 10.3 9.2 - 11.8 FL  
 NEUTROPHILS 82 (H) 40 - 73 % LYMPHOCYTES 10 (L) 21 - 52 % MONOCYTES 8 3 - 10 % EOSINOPHILS 0 0 - 5 % BASOPHILS 0 0 - 2 %  
 ABS. NEUTROPHILS 12.6 (H) 1.8 - 8.0 K/UL  
 ABS. LYMPHOCYTES 1.6 0.9 - 3.6 K/UL  
 ABS. MONOCYTES 1.3 (H) 0.05 - 1.2 K/UL  
 ABS. EOSINOPHILS 0.1 0.0 - 0.4 K/UL  
 ABS. BASOPHILS 0.0 0.0 - 0.1 K/UL  
 DF AUTOMATED METABOLIC PANEL, BASIC Collection Time: 04/22/19  9:15 AM  
Result Value Ref Range Sodium 141 136 - 145 mmol/L Potassium 3.9 3.5 - 5.5 mmol/L Chloride 106 100 - 108 mmol/L  
 CO2 28 21 - 32 mmol/L Anion gap 7 3.0 - 18 mmol/L Glucose 82 74 - 99 mg/dL BUN 13 7.0 - 18 MG/DL Creatinine 1.09 0.6 - 1.3 MG/DL  
 BUN/Creatinine ratio 12 12 - 20 GFR est AA >60 >60 ml/min/1.73m2 GFR est non-AA >60 >60 ml/min/1.73m2 Calcium 9.3 8.5 - 10.1 MG/DL Radiologist and cardiologist interpretations if available at time of this note: Xr Ankle Lt Min 3 V Result Date: 4/22/2019 EXAM: LEFT ANKLE RADIOGRAPHS CLINICAL INDICATION/HISTORY: Pain -Additional: None COMPARISON: None TECHNIQUE: 3 views of the left ankle _______________ FINDINGS: BONES: Osseous alignment is as expected on the provided projections. No fracture demonstrated. Ankle mortise is symmetric and intact in appearance. Mild enthesopathy at the Achilles tendon insertion. SOFT TISSUES: Mild lateral malleolar soft tissue swelling without retained radiopaque foreign object. _______________ IMPRESSION: 1. No fracture or acute malalignment involving the left ankle. 2. Mild lateral malleolar soft tissue swelling without retained radiopaque foreign object. Medication(s) ordered for patient during this emergency visit encounter: 
Medications  
oxyCODONE-acetaminophen (PERCOCET) 5-325 mg per tablet 1 Tab (1 Tab Oral Given 4/22/19 6634) HYDROcodone-acetaminophen (NORCO) 5-325 mg per tablet 1 Tab (1 Tab Oral Given 4/22/19 [id number]) Medical Decision Making I am the first provider for this patient. I reviewed the vital signs, available nursing notes, past medical history, past surgical history, family history and social history. Vital Signs:  Reviewed the patient's vital signs. 10:38 AM 
No uric acid is just above the upper limit of normal.  Given this along with the pain that is focal to the left ankle. I do think that it is worth treating him with indomethacin and/or colchicine. SPECIFIC PATIENT INSTRUCTIONS FROM THE PHYSICIAN WHO TREATED YOU IN THE ER TODAY: 
1. Return if worse. 2. Colchicine and indomethacin as prescribed. 3. IF Norco was prescribed, take if for pain not controlled with the other prescribed medications. 4. Reevaluation by your doctor in next 2-3 days if not improving. Patient is improved, resting quietly and comfortably. The patient will be discharged home. The patient was reassured that these symptoms do not appear to represent a serious or life threatening condition at this time. Warning signs of worsening condition were discussed and understood by the patient. Based on patient's age, coexisting illness, exam, and the results of this ED evaluation, the decision to treat as an outpatient was made. Based on the information available at time of discharge, acute pathology requiring immediate intervention was deemed relative unlikely. While it is impossible to completely exclude the possibility of underlying serious disease or worsening of condition, I feel the relative likelihood is extremely low. I discussed this uncertainty with the patient, who understood ED evaluation and treatment and felt comfortable with the outpatient treatment plan. All questions regarding care, test results, and follow up were answered. The patient is stable and appropriate to discharge. They understand that they should return to the emergency department for any new or worsening symptoms.  I stressed the importance of follow up for repeat assessment and possibly further evaluation/treatment. Dictation disclaimer:  Please note that this dictation was completed with [employer], the computer voice recognition software. Quite often unanticipated grammatical, syntax, homophones, and other interpretive errors are inadvertently transcribed by the computer software. Please disregard these errors. Please excuse any errors that have escaped final proofreading. Coding Diagnoses Clinical Impression: 1. Chronic pain of left ankle 2. Elevated blood uric acid level 3. Plaque psoriasis Disposition Disposition:  Home. RY Weber Board Certified Emergency Physician Provider Attestation: If a scribe was utilized in generation of this patient record, I personally performed the services described in the documentation, reviewed the documentation, as recorded by the scribe in my presence, and it accurately records the patient's history of presenting illness, review of systems, patient physical examination, and procedures performed by me as the attending physician. RY Weber Board Certified Emergency Physician 4/22/2019. 
8:52 AM

## 2019-04-22 NOTE — ED TRIAGE NOTES
\"I can't stand, excruciating pain in left foot\" Pain for a year taking medications for the pain. Quit taking one of the medications for about a week and now the pain is worse

## 2019-06-16 ENCOUNTER — HOSPITAL ENCOUNTER (EMERGENCY)
Age: 49
Discharge: HOME | End: 2019-06-16
Attending: EMERGENCY MEDICINE
Payer: MEDICAID

## 2019-06-16 VITALS
HEIGHT: 69 IN | DIASTOLIC BLOOD PRESSURE: 82 MMHG | RESPIRATION RATE: 18 BRPM | OXYGEN SATURATION: 100 % | SYSTOLIC BLOOD PRESSURE: 130 MMHG | BODY MASS INDEX: 31.1 KG/M2 | WEIGHT: 210 LBS | HEART RATE: 83 BPM | TEMPERATURE: 98.6 F

## 2019-06-16 DIAGNOSIS — M25.50 ARTHRALGIA, UNSPECIFIED JOINT: Primary | ICD-10-CM

## 2019-06-16 LAB
ANION GAP SERPL CALC-SCNC: 8 MMOL/L (ref 3–18)
BASOPHILS # BLD: 0 K/UL (ref 0–0.1)
BASOPHILS NFR BLD: 0 % (ref 0–2)
BUN SERPL-MCNC: 13 MG/DL (ref 7–18)
BUN/CREAT SERPL: 12 (ref 12–20)
CALCIUM SERPL-MCNC: 9.4 MG/DL (ref 8.5–10.1)
CHLORIDE SERPL-SCNC: 103 MMOL/L (ref 100–108)
CO2 SERPL-SCNC: 25 MMOL/L (ref 21–32)
CREAT SERPL-MCNC: 1.11 MG/DL (ref 0.6–1.3)
DIFFERENTIAL METHOD BLD: ABNORMAL
EOSINOPHIL # BLD: 0.1 K/UL (ref 0–0.4)
EOSINOPHIL NFR BLD: 1 % (ref 0–5)
ERYTHROCYTE [DISTWIDTH] IN BLOOD BY AUTOMATED COUNT: 12.9 % (ref 11.6–14.5)
GLUCOSE SERPL-MCNC: 98 MG/DL (ref 74–99)
HCT VFR BLD AUTO: 49.1 % (ref 36–48)
HGB BLD-MCNC: 16.4 G/DL (ref 13–16)
LYMPHOCYTES # BLD: 2.2 K/UL (ref 0.9–3.6)
LYMPHOCYTES NFR BLD: 18 % (ref 21–52)
MCH RBC QN AUTO: 32.3 PG (ref 24–34)
MCHC RBC AUTO-ENTMCNC: 33.4 G/DL (ref 31–37)
MCV RBC AUTO: 96.7 FL (ref 74–97)
MONOCYTES # BLD: 1.3 K/UL (ref 0.05–1.2)
MONOCYTES NFR BLD: 11 % (ref 3–10)
NEUTS SEG # BLD: 8.5 K/UL (ref 1.8–8)
NEUTS SEG NFR BLD: 70 % (ref 40–73)
PLATELET # BLD AUTO: 304 K/UL (ref 135–420)
PMV BLD AUTO: 10.3 FL (ref 9.2–11.8)
POTASSIUM SERPL-SCNC: 4.1 MMOL/L (ref 3.5–5.5)
RBC # BLD AUTO: 5.08 M/UL (ref 4.7–5.5)
SODIUM SERPL-SCNC: 136 MMOL/L (ref 136–145)
URATE SERPL-MCNC: 8.3 MG/DL (ref 2.6–7.2)
WBC # BLD AUTO: 12.1 K/UL (ref 4.6–13.2)

## 2019-06-16 PROCEDURE — 99284 EMERGENCY DEPT VISIT MOD MDM: CPT

## 2019-06-16 PROCEDURE — 85025 COMPLETE CBC W/AUTO DIFF WBC: CPT

## 2019-06-16 PROCEDURE — 74011636637 HC RX REV CODE- 636/637: Performed by: EMERGENCY MEDICINE

## 2019-06-16 PROCEDURE — 84550 ASSAY OF BLOOD/URIC ACID: CPT

## 2019-06-16 PROCEDURE — 80048 BASIC METABOLIC PNL TOTAL CA: CPT

## 2019-06-16 RX ORDER — PREDNISONE 10 MG/1
TABLET ORAL
Qty: 48 TAB | Refills: 0 | Status: SHIPPED | OUTPATIENT
Start: 2019-06-16

## 2019-06-16 RX ORDER — PREDNISONE 10 MG/1
60 TABLET ORAL
Status: COMPLETED | OUTPATIENT
Start: 2019-06-16 | End: 2019-06-16

## 2019-06-16 RX ADMIN — PREDNISONE 60 MG: 10 TABLET ORAL at 18:58

## 2019-06-16 NOTE — DISCHARGE INSTRUCTIONS
Patient Education   Follow-up with your primary care physician and rheumatologist for further evaluation and treatment of your chronic joint pain. Joint Pain: Care Instructions  Your Care Instructions    Many people have small aches and pains from overuse or injury to muscles and joints. Joint injuries often happen during sports or recreation, work tasks, or projects around the home. An overuse injury can happen when you put too much stress on a joint or when you do an activity that stresses the joint over and over, such as using the computer or rowing a boat. You can take action at home to help your muscles and joints get better. You should feel better in 1 to 2 weeks, but it can take 3 months or more to heal completely. Follow-up care is a key part of your treatment and safety. Be sure to make and go to all appointments, and call your doctor if you are having problems. It's also a good idea to know your test results and keep a list of the medicines you take. How can you care for yourself at home? · Do not put weight on the injured joint for at least a day or two. · For the first day or two after an injury, do not take hot showers or baths, and do not use hot packs. The heat could make swelling worse. · Put ice or a cold pack on the sore joint for 10 to 20 minutes at a time. Try to do this every 1 to 2 hours for the next 3 days (when you are awake) or until the swelling goes down. Put a thin cloth between the ice and your skin. · Wrap the injury in an elastic bandage. Do not wrap it too tightly because this can cause more swelling. · Prop up the sore joint on a pillow when you ice it or anytime you sit or lie down during the next 3 days. Try to keep it above the level of your heart. This will help reduce swelling. · Take an over-the-counter pain medicine, such as acetaminophen (Tylenol), ibuprofen (Advil, Motrin), or naproxen (Aleve). Read and follow all instructions on the label.   · After 1 or 2 days of rest, begin moving the joint gently. While the joint is still healing, you can begin to exercise using activities that do not strain or hurt the painful joint. When should you call for help? Call your doctor now or seek immediate medical care if:    · You have signs of infection, such as:  ? Increased pain, swelling, warmth, and redness. ? Red streaks leading from the joint. ? A fever.    Watch closely for changes in your health, and be sure to contact your doctor if:    · Your movement or symptoms are not getting better after 1 to 2 weeks of home treatment. Where can you learn more? Go to http://alexi-celestino.info/. Enter P205 in the search box to learn more about \"Joint Pain: Care Instructions. \"  Current as of: September 20, 2018  Content Version: 11.9  © 6538-6323 [employer]. Care instructions adapted under license by [employer] (which disclaims liability or warranty for this information). If you have questions about a medical condition or this instruction, always ask your healthcare professional. [address] any warranty or liability for your use of this information.

## 2019-06-16 NOTE — ED PROVIDER NOTES
Emergency Department Treatment Report    Patient: Melany Morton Age: 50 y.o. Sex: male    YOB: 1970 Admit Date: 6/16/2019 PCP: Shanell Leon MD   MRN: [id number]  CSN: [id number]     Room: [place] [address] Time Dictated: 6:28 PM      Chief Complaint   Chief Complaint   Patient presents with    Foot Pain    Rib Pain    Rash    Arm Pain       History of Present Illness   50 y.o. male, PMH psoriatic arthritis, presents with diffuse arthralgias, worse in his right wrist and left ankle. He has followed up with a primary care physician, but has not followed up with the rheumatologist.  He is saying that the pain is now making it difficult for him to walk. He denies fevers, headache, injuries, chest pain or shortness of breath. Review of Systems   Constitutional: No fever, chills, or weight loss  Eyes: No visual symptoms. ENT: No sore throat, runny nose or ear pain. Respiratory: No cough, dyspnea or wheezing. Cardiovascular: No chest pain, pressure, palpitations, tightness or heaviness. Gastrointestinal: No vomiting, diarrhea or abdominal pain. Genitourinary: No dysuria, frequency, or urgency. Musculoskeletal: +joint pain and swelling  Integumentary: +diffuse rashes  Neurological: No headaches, sensory or motor symptoms. Denies complaints in all other systems. Past Medical/Surgical History     Past Medical History:   Diagnosis Date    Arthritis      History reviewed. No pertinent surgical history.     Social History     Social History     Socioeconomic History    Marital status: SINGLE     Spouse name: Not on file    Number of children: Not on file    Years of education: Not on file    Highest education level: Not on file   Tobacco Use    Smoking status: Current Every Day Smoker     Packs/day: 0.25    Smokeless tobacco: Never Used   Substance and Sexual Activity    Alcohol use: Yes     Comment: Pt states, \"Occasionally, one day a week\"     Drug use: Yes     Types: Marijuana Comment: Last smoked marijuana today        Family History   History reviewed. No pertinent family history. Home Medications     Prior to Admission Medications   Prescriptions Last Dose Informant Patient Reported? Taking? amLODIPine (NORVASC) 5 mg tablet   No No   Sig: Take 1 Tab by mouth daily. colchicine 0.6 mg tablet   No No   Sig: Take 1 tablet by mouth Q1Hour for gout pain. NOT TO EXCEED 3 TABLETS IN 24 HOURS. cyclobenzaprine (FLEXERIL) 10 mg tablet   No No   Sig: Take 1 tablet by mouth three (3) times daily as needed for Muscle Spasm(s). diazepam (VALIUM) 10 mg tablet   No No   Sig: Take 1 tablet by mouth every eight (8) hours as needed for Anxiety. gabapentin (NEURONTIN) 100 mg capsule   No No   Sig: Take one at night for 3 nights then take 3 at night   indomethacin (INDOCIN) 25 mg capsule   No No   Sig: Take 2 Caps by mouth three (3) times daily. With food   meloxicam (MOBIC) 7.5 mg tablet   No No   Sig: Take one twice a day   methylPREDNISolone (MEDROL, BRIT,) 4 mg tablet   No No   Sig: Per dose pack instructions      Facility-Administered Medications: None       Allergies   No Known Allergies    Physical Exam     ED Triage Vitals [06/16/19 1656]   ED Encounter Vitals Group      BP (!) 131/100      Pulse (Heart Rate) 88      Resp Rate 18      Temp 98.6 °F (37 °C)      Temp src       O2 Sat (%) 98 %      Weight 210 lb      Height 5' 9\"     Constitutional: Patient appears well developed and well nourished. Appearance and behavior are age and situation appropriate. HEENT: Conjunctiva clear. PERRLA. Mucous membranes moist, non-erythematous. Surface of the pharynx, palate, and tongue are pink, moist and without lesions. Neck: supple, non tender, symmetrical, no masses or JVD. Respiratory: lungs clear to auscultation, nonlabored respirations. No tachypnea or accessory muscle use. Cardiovascular: heart regular rate and rhythm without murmur rubs or gallops. Calves soft and non-tender.  Distal pulses 2+ and equal bilaterally. No peripheral edema. Gastrointestinal:  Abdomen soft, nontender without complaint of pain to palpation  Musculoskeletal: Mild tenderness and swelling of left ankle and right wrist without erythema or warmth  Integumentary: Diffuse psoriatic plaques  Neurologic: alert and oriented, Sensation intact, motor strength equal and symmetric. No facial asymmetry or dysarthria. Diagnostic Studies   Lab:   Recent Results (from the past 12 hour(s))   CBC WITH AUTOMATED DIFF    Collection Time: 06/16/19  5:30 PM   Result Value Ref Range    WBC 12.1 4.6 - 13.2 K/uL    RBC 5.08 4.70 - 5.50 M/uL    HGB 16.4 (H) 13.0 - 16.0 g/dL    HCT 49.1 (H) 36.0 - 48.0 %    MCV 96.7 74.0 - 97.0 FL    MCH 32.3 24.0 - 34.0 PG    MCHC 33.4 31.0 - 37.0 g/dL    RDW 12.9 11.6 - 14.5 %    PLATELET 846 873 - 802 K/uL    MPV 10.3 9.2 - 11.8 FL    NEUTROPHILS 70 40 - 73 %    LYMPHOCYTES 18 (L) 21 - 52 %    MONOCYTES 11 (H) 3 - 10 %    EOSINOPHILS 1 0 - 5 %    BASOPHILS 0 0 - 2 %    ABS. NEUTROPHILS 8.5 (H) 1.8 - 8.0 K/UL    ABS. LYMPHOCYTES 2.2 0.9 - 3.6 K/UL    ABS. MONOCYTES 1.3 (H) 0.05 - 1.2 K/UL    ABS. EOSINOPHILS 0.1 0.0 - 0.4 K/UL    ABS. BASOPHILS 0.0 0.0 - 0.1 K/UL    DF AUTOMATED     METABOLIC PANEL, BASIC    Collection Time: 06/16/19  5:30 PM   Result Value Ref Range    Sodium 136 136 - 145 mmol/L    Potassium 4.1 3.5 - 5.5 mmol/L    Chloride 103 100 - 108 mmol/L    CO2 25 21 - 32 mmol/L    Anion gap 8 3.0 - 18 mmol/L    Glucose 98 74 - 99 mg/dL    BUN 13 7.0 - 18 MG/DL    Creatinine 1.11 0.6 - 1.3 MG/DL    BUN/Creatinine ratio 12 12 - 20      GFR est AA >60 >60 ml/min/1.73m2    GFR est non-AA >60 >60 ml/min/1.73m2    Calcium 9.4 8.5 - 10.1 MG/DL   URIC ACID    Collection Time: 06/16/19  5:30 PM   Result Value Ref Range    Uric acid 8.3 (H) 2.6 - 7.2 MG/DL       Imaging:    No results found. Yasmin[facility]ivings    ED Course/Medical Decision Making   Patient has worsening arthralgias.   There are no signs of septic joint.  Will provide him with a prednisone taper and have him follow-up with his primary care physician. Also provided him with information about rheumatologists. Medications   predniSONE (DELTASONE) tablet 60 mg (has no administration in time range)       Final Diagnosis       ICD-10-CM ICD-9-CM   1. Arthralgia, unspecified joint M25.50 719.40       Disposition   Patient is discharged home in stable condition. Advised to follow with their primary care physician. Patient advised to return to the ER for any new or worsening symptoms. My Medications      START taking these medications      Instructions Each Dose to Equal Morning Noon Evening Bedtime   predniSONE 10 mg dose pack  Commonly known as:  STERAPRED DS    Your last dose was: Your next dose is:          SIG: Take 6 tabs day 1, Then 6 tabs day 2, Then 5 tabs day 3, Then 5 tabs day 4, Then 4 tabs day 5, Then 4 tabs day 6, Then 3 tabs day 7, Then 3 tabs day 8, Then 3 tabs day 9, Then 2 tabs day 10, Then 2 tabs day 11, Then 2 tabs day 12, Then 1 tab day 13, Then 1 tab day 14, Then 1 tab day 15. CONTINUE taking these medications      Instructions Each Dose to Equal Morning Noon Evening Bedtime   amLODIPine 5 mg tablet  Commonly known as:  NORVASC    Your last dose was: Your next dose is: Take 1 Tab by mouth daily. 5 mg                 colchicine 0.6 mg tablet    Your last dose was: Your next dose is: Take 1 tablet by mouth Q1Hour for gout pain. NOT TO EXCEED 3 TABLETS IN 24 HOURS. cyclobenzaprine 10 mg tablet  Commonly known as:  FLEXERIL    Your last dose was: Your next dose is: Take 1 tablet by mouth three (3) times daily as needed for Muscle Spasm(s). 10 mg                 diazePAM 10 mg tablet  Commonly known as:  VALIUM    Your last dose was: Your next dose is: Take 1 tablet by mouth every eight (8) hours as needed for Anxiety.    10 mg gabapentin 100 mg capsule  Commonly known as:  NEURONTIN    Your last dose was: Your next dose is: Take one at night for 3 nights then take 3 at night                  indomethacin 25 mg capsule  Commonly known as:  INDOCIN    Your last dose was: Your next dose is: Take 2 Caps by mouth three (3) times daily. With food   50 mg                 meloxicam 7.5 mg tablet  Commonly known as:  MOBIC    Your last dose was: Your next dose is: Take one twice a day                     STOP taking these medications    methylPREDNISolone 4 mg tablet  Commonly known as:  MEDROL (BRIT)              Where to Get Your Medications      Information on where to get these meds will be given to you by the nurse or doctor. Ask your nurse or doctor about these medications  · predniSONE 10 mg dose pack           Elkin Steel MD  June 16, 2019    My signature above authenticates this document and my orders, the final    diagnosis (es), discharge prescription (s), and instructions in the Epic    record. If you have any questions please contact [phone]. Nursing notes have been reviewed by the physician/ advanced practice    Clinician. Disclaimer: Sections of this note are dictated using utilizing voice recognition software. Minor typographical errors may be present. If questions arise, please do not hesitate to contact me or call our department.

## 2019-06-16 NOTE — ED NOTES
I have reviewed discharge instructions with the patient. The patient verbalized understanding. Pt in no distress at time of discharge and agreeable to terms of discharge.

## 2019-06-25 NOTE — PROGRESS NOTES
6/25/19  1445 Referral received to assist in obtaining insurance. Chart reviewed and noted that pt has [place] [employer].

## 2020-11-23 ENCOUNTER — APPOINTMENT (OUTPATIENT)
Dept: GENERAL RADIOLOGY | Age: 50
End: 2020-11-23
Attending: PHYSICIAN ASSISTANT
Payer: MEDICAID

## 2020-11-23 ENCOUNTER — HOSPITAL ENCOUNTER (EMERGENCY)
Age: 50
Discharge: HOME | End: 2020-11-23
Attending: EMERGENCY MEDICINE | Admitting: EMERGENCY MEDICINE
Payer: MEDICAID

## 2020-11-23 VITALS
OXYGEN SATURATION: 94 % | TEMPERATURE: 97.5 F | HEART RATE: 110 BPM | SYSTOLIC BLOOD PRESSURE: 193 MMHG | RESPIRATION RATE: 19 BRPM | DIASTOLIC BLOOD PRESSURE: 127 MMHG

## 2020-11-23 DIAGNOSIS — S61.209A FLEXOR TENDON LACERATION OF FINGER WITH OPEN WOUND, INITIAL ENCOUNTER: ICD-10-CM

## 2020-11-23 DIAGNOSIS — S61.215A LACERATION OF LEFT RING FINGER WITHOUT FOREIGN BODY WITHOUT DAMAGE TO NAIL, INITIAL ENCOUNTER: Primary | ICD-10-CM

## 2020-11-23 DIAGNOSIS — S62.655A CLOSED NONDISPLACED FRACTURE OF MIDDLE PHALANX OF LEFT RING FINGER, INITIAL ENCOUNTER: ICD-10-CM

## 2020-11-23 DIAGNOSIS — S56.129A FLEXOR TENDON LACERATION OF FINGER WITH OPEN WOUND, INITIAL ENCOUNTER: ICD-10-CM

## 2020-11-23 LAB
ALBUMIN SERPL-MCNC: 4 G/DL (ref 3.4–5)
ALBUMIN/GLOB SERPL: 1 {RATIO} (ref 0.8–1.7)
ALP SERPL-CCNC: 71 U/L (ref 45–117)
ALT SERPL-CCNC: 27 U/L (ref 16–61)
ANION GAP SERPL CALC-SCNC: 6 MMOL/L (ref 3–18)
AST SERPL-CCNC: 16 U/L (ref 10–38)
BASOPHILS # BLD: 0.1 K/UL (ref 0–0.1)
BASOPHILS NFR BLD: 1 % (ref 0–2)
BILIRUB SERPL-MCNC: 0.4 MG/DL (ref 0.2–1)
BUN SERPL-MCNC: 23 MG/DL (ref 7–18)
BUN/CREAT SERPL: 18 (ref 12–20)
CALCIUM SERPL-MCNC: 9 MG/DL (ref 8.5–10.1)
CHLORIDE SERPL-SCNC: 108 MMOL/L (ref 100–111)
CO2 SERPL-SCNC: 26 MMOL/L (ref 21–32)
CREAT SERPL-MCNC: 1.25 MG/DL (ref 0.6–1.3)
DIFFERENTIAL METHOD BLD: ABNORMAL
EOSINOPHIL # BLD: 0.2 K/UL (ref 0–0.4)
EOSINOPHIL NFR BLD: 2 % (ref 0–5)
ERYTHROCYTE [DISTWIDTH] IN BLOOD BY AUTOMATED COUNT: 12.8 % (ref 11.6–14.5)
GLOBULIN SER CALC-MCNC: 4.1 G/DL (ref 2–4)
GLUCOSE SERPL-MCNC: 87 MG/DL (ref 74–99)
HCT VFR BLD AUTO: 46.3 % (ref 36–48)
HGB BLD-MCNC: 15.5 G/DL (ref 13–16)
LYMPHOCYTES # BLD: 2.1 K/UL (ref 0.9–3.6)
LYMPHOCYTES NFR BLD: 24 % (ref 21–52)
MCH RBC QN AUTO: 32.5 PG (ref 24–34)
MCHC RBC AUTO-ENTMCNC: 33.5 G/DL (ref 31–37)
MCV RBC AUTO: 97.1 FL (ref 74–97)
MONOCYTES # BLD: 1.1 K/UL (ref 0.05–1.2)
MONOCYTES NFR BLD: 12 % (ref 3–10)
NEUTS SEG # BLD: 5.3 K/UL (ref 1.8–8)
NEUTS SEG NFR BLD: 61 % (ref 40–73)
PLATELET # BLD AUTO: 262 K/UL (ref 135–420)
PMV BLD AUTO: 10.7 FL (ref 9.2–11.8)
POTASSIUM SERPL-SCNC: 3.7 MMOL/L (ref 3.5–5.5)
PROT SERPL-MCNC: 8.1 G/DL (ref 6.4–8.2)
RBC # BLD AUTO: 4.77 M/UL (ref 4.7–5.5)
SODIUM SERPL-SCNC: 140 MMOL/L (ref 136–145)
WBC # BLD AUTO: 8.7 K/UL (ref 4.6–13.2)

## 2020-11-23 PROCEDURE — 80053 COMPREHEN METABOLIC PANEL: CPT

## 2020-11-23 PROCEDURE — 75810000293 HC SIMP/SUPERF WND  RPR

## 2020-11-23 PROCEDURE — 75810000053 HC SPLINT APPLICATION

## 2020-11-23 PROCEDURE — 74011250636 HC RX REV CODE- 250/636

## 2020-11-23 PROCEDURE — 96376 TX/PRO/DX INJ SAME DRUG ADON: CPT

## 2020-11-23 PROCEDURE — 73140 X-RAY EXAM OF FINGER(S): CPT

## 2020-11-23 PROCEDURE — 96375 TX/PRO/DX INJ NEW DRUG ADDON: CPT

## 2020-11-23 PROCEDURE — 99283 EMERGENCY DEPT VISIT LOW MDM: CPT

## 2020-11-23 PROCEDURE — 74011250636 HC RX REV CODE- 250/636: Performed by: PHYSICIAN ASSISTANT

## 2020-11-23 PROCEDURE — 96365 THER/PROPH/DIAG IV INF INIT: CPT

## 2020-11-23 PROCEDURE — 74011000250 HC RX REV CODE- 250: Performed by: EMERGENCY MEDICINE

## 2020-11-23 PROCEDURE — 85025 COMPLETE CBC W/AUTO DIFF WBC: CPT

## 2020-11-23 RX ORDER — MORPHINE SULFATE 4 MG/ML
INJECTION, SOLUTION INTRAMUSCULAR; INTRAVENOUS
Status: COMPLETED
Start: 2020-11-23 | End: 2020-11-23

## 2020-11-23 RX ORDER — MORPHINE SULFATE 4 MG/ML
4 INJECTION, SOLUTION INTRAMUSCULAR; INTRAVENOUS
Status: COMPLETED | OUTPATIENT
Start: 2020-11-23 | End: 2020-11-23

## 2020-11-23 RX ORDER — BUPIVACAINE HYDROCHLORIDE 5 MG/ML
5 INJECTION, SOLUTION EPIDURAL; INTRACAUDAL ONCE
Status: COMPLETED | OUTPATIENT
Start: 2020-11-23 | End: 2020-11-23

## 2020-11-23 RX ORDER — CEPHALEXIN 500 MG/1
500 CAPSULE ORAL 4 TIMES DAILY
Qty: 28 CAP | Refills: 0 | Status: SHIPPED | OUTPATIENT
Start: 2020-11-23 | End: 2020-11-30

## 2020-11-23 RX ORDER — BUPIVACAINE HYDROCHLORIDE 5 MG/ML
5 INJECTION, SOLUTION PERINEURAL ONCE
Status: DISCONTINUED | OUTPATIENT
Start: 2020-11-23 | End: 2020-11-23

## 2020-11-23 RX ORDER — CEFAZOLIN SODIUM 2 G/50ML
2 SOLUTION INTRAVENOUS ONCE
Status: COMPLETED | OUTPATIENT
Start: 2020-11-23 | End: 2020-11-23

## 2020-11-23 RX ADMIN — MORPHINE SULFATE 4 MG: 4 INJECTION, SOLUTION INTRAMUSCULAR; INTRAVENOUS at 11:27

## 2020-11-23 RX ADMIN — CEFAZOLIN SODIUM 2 G: 2 SOLUTION INTRAVENOUS at 10:31

## 2020-11-23 RX ADMIN — MORPHINE SULFATE 4 MG: 4 INJECTION, SOLUTION INTRAMUSCULAR; INTRAVENOUS at 10:12

## 2020-11-23 RX ADMIN — BUPIVACAINE HYDROCHLORIDE 25 MG: 5 INJECTION, SOLUTION EPIDURAL; INTRACAUDAL; PERINEURAL at 12:55

## 2020-11-23 NOTE — DISCHARGE INSTRUCTIONS
Patient Education     Please return immediately to the Emergency Room for re-evaluation if you are not improving, develop any new symptoms, or develop worsening of current symptoms! If you have been prescribed a medication and are unable to take this medication for any reason, please return to the Emergency Department for further evaluation! If you have been referred for follow-up to a specialist, but are unable to follow-up and your symptoms are either not improving or are worsening, please return to the Emergency Department for further evaluation! Finger Fracture: Care Instructions  Your Care Instructions     Breaks in the bones of the finger usually heal well in about 3 to 4 weeks. The pain and swelling from a broken finger can last for weeks. But it should steadily improve, starting a few days after you break it. It is very important that you wear and take care of the cast or splint exactly as your doctor tells you to so that your finger heals properly and does not end up crooked. Wearing a splint may interfere with your normal activities. Ask for help with daily tasks if you need it. You heal best when you take good care of yourself. Eat a variety of healthy foods, and don't smoke. Follow-up care is a key part of your treatment and safety. Be sure to make and go to all appointments, and call your doctor if you are having problems. It's also a good idea to know your test results and keep a list of the medicines you take. How can you care for yourself at home? · If your doctor put a splint on your finger, wear the splint exactly as directed. Do not remove it until your doctor says that you can. · Keep your hand raised above the level of your heart as much as you can. This will help reduce swelling. · Put ice or a cold pack on your finger for 10 to 20 minutes at a time. Try to do this every 1 to 2 hours for the next 3 days (when you are awake) or until the swelling goes down.  Put a thin cloth between the ice and your skin. Keep the splint dry. · Be safe with medicines. Take pain medicines exactly as directed. ? If the doctor gave you a prescription medicine for pain, take it as prescribed. ? If you are not taking a prescription pain medicine, ask your doctor if you can take an over-the-counter medicine. When should you call for help? Call 911 anytime you think you may need emergency care. For example, call if:    · Your finger is cool or pale or changes color. Call your doctor now or seek immediate medical care if:    · Your pain gets much worse.     · You have tingling, weakness, or numbness in your finger.     · You have signs of infection, such as:  ? Increased pain, swelling, warmth, or redness. ? Red streaks leading from the area. ? Pus draining from the area. ? Swollen lymph nodes in your neck, armpits, or groin. ? A fever. Watch closely for changes in your health, and be sure to contact your doctor if:    · Your finger is not steadily improving. Where can you learn more? Go to http://www.gray.com/  Enter Q354 in the search box to learn more about \"Finger Fracture: Care Instructions. \"  Current as of: March 2, 2020               Content Version: 12.6  © 9908-5587 [employer]. Care instructions adapted under license by [employer] (which disclaims liability or warranty for this information). If you have questions about a medical condition or this instruction, always ask your healthcare professional. [place] [address] any warranty or liability for your use of this information. Patient Education        Cuts on the Hand Closed With Stitches: Care Instructions  Your Care Instructions     A cut on your hand can be on your fingers, your thumb, or the front or back of your hand. Sometimes a cut can injure the tendons, blood vessels, or nerves of your hand. The doctor used stitches to close the cut.  Using stitches also helps the cut heal and reduces scarring. The doctor may have given you a splint to help prevent you from moving your hand, fingers, or thumb. If the cut went deep and through the skin, the doctor put in two layers of stitches. The deeper layer brings the deep part of the cut together. These stitches will dissolve and don't need to be removed. The stitches in the upper layer are the ones you see on the cut. You will probably have a bandage. You will need to have the stitches removed, usually in 7 to 14 days. The doctor may suggest that you see a hand specialist if the cut is very deep or if you have trouble moving your fingers or have less feeling in your hand. The doctor has checked you carefully, but problems can develop later. If you notice any problems or new symptoms, get medical treatment right away. Follow-up care is a key part of your treatment and safety. Be sure to make and go to all appointments, and call your doctor if you are having problems. It's also a good idea to know your test results and keep a list of the medicines you take. How can you care for yourself at home? · Keep the cut dry for the first 24 to 48 hours. After this, you can shower if your doctor okays it. Pat the cut dry. · Don't soak the cut, such as in a bathtub. Your doctor will tell you when it's safe to get the cut wet. · If your doctor told you how to care for your cut, follow your doctor's instructions. If you did not get instructions, follow this general advice:  ? After the first 24 to 48 hours, wash around the cut with clean water 2 times a day. Don't use hydrogen peroxide or alcohol, which can slow healing. ? You may cover the cut with a thin layer of petroleum jelly, such as Vaseline, and a nonstick bandage. ? Apply more petroleum jelly and replace the bandage as needed. · Prop up the sore hand on a pillow anytime you sit or lie down during the next 3 days. Try to keep it above the level of your heart.  This will help reduce swelling. · Avoid any activity that could cause your cut to reopen. · Do not remove the stitches on your own. Your doctor will tell you when to come back to have the stitches removed. · Be safe with medicines. Take pain medicines exactly as directed. ? If the doctor gave you a prescription medicine for pain, take it as prescribed. ? If you are not taking a prescription pain medicine, ask your doctor if you can take an over-the-counter medicine. When should you call for help? Call your doctor now or seek immediate medical care if:    · You have new pain, or your pain gets worse.     · The skin near the cut is cold or pale or changes color.     · You have tingling, weakness, or numbness near the cut.     · The cut starts to bleed, and blood soaks through the bandage. Oozing small amounts of blood is normal.     · You have trouble moving the area of the hand near the cut.     · You have symptoms of infection, such as:  ? Increased pain, swelling, warmth, or redness around the cut.  ? Red streaks leading from the cut.  ? Pus draining from the cut.  ? A fever. Watch closely for changes in your health, and be sure to contact your doctor if:    · You do not get better as expected. Where can you learn more? Go to http://www.gray.com/  Enter T250 in the search box to learn more about \"Cuts on the Hand Closed With Stitches: Care Instructions. \"  Current as of: June 26, 2019               Content Version: 12.6  © 0736-3917 [employer]. Care instructions adapted under license by [employer] (which disclaims liability or warranty for this information). If you have questions about a medical condition or this instruction, always ask your healthcare professional. [address] any warranty or liability for your use of this information.

## 2020-11-23 NOTE — ED TRIAGE NOTES
Pt reports \" I dropped an engine on my hand\" Pt presents with laceration to fourth digit. States not up to date on tetanus shot.

## 2020-11-23 NOTE — ED PROVIDER NOTES
EMERGENCY DEPARTMENT HISTORY AND PHYSICAL EXAM    10:13 AM      Date: 11/23/2020  Patient Name: Dara Oneill    History of Presenting Illness     Chief Complaint   Patient presents with    Laceration    Finger Pain       History Provided By: Patient    Chief Complaint: left ring finger laceration  Duration: 20 Minutes  Timing:  Acute  Location:   Quality: Aching  Severity: Moderate  Modifying Factors: none  Associated Symptoms: denies any other associated signs or symptoms      Additional History (Context): Dara Oneill is a 52 y.o. male with a pertinent history of OA who presents to the emergency department for evaluation of left ring finger laceration which occurred approximately 20 minutes prior to arrival when an engine fell on his finger. Patient reports pain is severe, 7 out of 10. Tetanus is not up-to-date. Patient reports he does have a history of heart disease and diabetes, but does not take his medications. He states he does not like the way they make him feel. Patient reports he is chronic severe arthritis for which he takes 5 or 6 Aleve daily. Patient denies any other injuries or concerns today. PCP:  Chau Soliz MD        Current Outpatient Medications   Medication Sig Dispense Refill    cephALEXin (Keflex) 500 mg capsule Take 1 Cap by mouth four (4) times daily for 7 days. 28 Cap 0    predniSONE (STERAPRED DS) 10 mg dose pack SIG: Take 6 tabs day 1, Then 6 tabs day 2, Then 5 tabs day 3, Then 5 tabs day 4, Then 4 tabs day 5, Then 4 tabs day 6, Then 3 tabs day 7, Then 3 tabs day 8, Then 3 tabs day 9, Then 2 tabs day 10, Then 2 tabs day 11, Then 2 tabs day 12, Then 1 tab day 13, Then 1 tab day 14, Then 1 tab day 15. 48 Tab 0    colchicine 0.6 mg tablet Take 1 tablet by mouth Q1Hour for gout pain. NOT TO EXCEED 3 TABLETS IN 24 HOURS. 30 Tab 0    indomethacin (INDOCIN) 25 mg capsule Take 2 Caps by mouth three (3) times daily.  With food 60 Cap 0    gabapentin (NEURONTIN) 100 mg capsule Take one at night for 3 nights then take 3 at night 60 Cap 1    amLODIPine (NORVASC) 5 mg tablet Take 1 Tab by mouth daily. 30 Tab 3    meloxicam (MOBIC) 7.5 mg tablet Take one twice a day 60 Tab 2    diazepam (VALIUM) 10 mg tablet Take 1 tablet by mouth every eight (8) hours as needed for Anxiety. 20 tablet 0    cyclobenzaprine (FLEXERIL) 10 mg tablet Take 1 tablet by mouth three (3) times daily as needed for Muscle Spasm(s). 20 tablet 0       Past History     Past Medical History:  Past Medical History:   Diagnosis Date    Arthritis        Past Surgical History:  History reviewed. No pertinent surgical history. Family History:  History reviewed. No pertinent family history. Social History:  Social History     Tobacco Use    Smoking status: Current Every Day Smoker     Packs/day: 0.25    Smokeless tobacco: Never Used   Substance Use Topics    Alcohol use: Yes     Comment: Pt states, \"Occasionally, one day a week\"     Drug use: Yes     Types: Marijuana     Comment: Last smoked marijuana today        Allergies:  No Known Allergies      Review of Systems       Review of Systems   Constitutional: Negative for chills and fever. HENT: Negative for congestion, rhinorrhea and sore throat. Respiratory: Negative for cough and shortness of breath. Cardiovascular: Negative for chest pain. Gastrointestinal: Negative for abdominal pain, blood in stool, constipation, diarrhea, nausea and vomiting. Genitourinary: Negative for dysuria, frequency and hematuria. Musculoskeletal: Negative for back pain and myalgias. Skin: Positive for wound. Negative for rash. Neurological: Negative for dizziness and headaches. All other systems reviewed and are negative. Physical Exam     Visit Vitals  BP (!) 193/127 (BP 1 Location: Left arm, BP Patient Position: Sitting)   Pulse (!) 110   Temp 97.5 °F (36.4 °C)   Resp 19   SpO2 94%       Physical Exam  Vitals signs and nursing note reviewed. Constitutional:       General: He is in acute distress. Appearance: He is well-developed. He is not diaphoretic. Comments: Pt appears very anxious   HENT:      Head: Normocephalic and atraumatic. Eyes:      Conjunctiva/sclera: Conjunctivae normal.   Neck:      Musculoskeletal: Normal range of motion and neck supple. Cardiovascular:      Rate and Rhythm: Regular rhythm. Tachycardia present. Heart sounds: Normal heart sounds. Pulmonary:      Effort: Pulmonary effort is normal. No respiratory distress. Breath sounds: Normal breath sounds. Chest:      Chest wall: No tenderness. Musculoskeletal: Normal range of motion. General: No deformity. Comments: 3 cm guttate laceration noted to the volar aspect of the left fourth middle finger. Laceration involves flexor tendon. Due to patient not tolerating exam, it is unclear if this is a full flexor tendon laceration. Patient has reduced flexion at the PIP and DIP joints. Full,5/5 flexion noted at the MCP joint of the left fourth finger. Intact distal sensation noted with cap refill less than 2 seconds noted to the left fourth finger. Bleeding controlled. Skin:     General: Skin is warm and dry. Neurological:      Mental Status: He is alert and oriented to person, place, and time. Diagnostic Study Results     Labs -  Recent Results (from the past 12 hour(s))   CBC WITH AUTOMATED DIFF    Collection Time: 11/23/20 10:00 AM   Result Value Ref Range    WBC 8.7 4.6 - 13.2 K/uL    RBC 4.77 4.70 - 5.50 M/uL    HGB 15.5 13.0 - 16.0 g/dL    HCT 46.3 36.0 - 48.0 %    MCV 97.1 (H) 74.0 - 97.0 FL    MCH 32.5 24.0 - 34.0 PG    MCHC 33.5 31.0 - 37.0 g/dL    RDW 12.8 11.6 - 14.5 %    PLATELET 168 456 - 163 K/uL    MPV 10.7 9.2 - 11.8 FL    NEUTROPHILS 61 40 - 73 %    LYMPHOCYTES 24 21 - 52 %    MONOCYTES 12 (H) 3 - 10 %    EOSINOPHILS 2 0 - 5 %    BASOPHILS 1 0 - 2 %    ABS. NEUTROPHILS 5.3 1.8 - 8.0 K/UL    ABS.  LYMPHOCYTES 2.1 0.9 - 3.6 K/UL    ABS. MONOCYTES 1.1 0.05 - 1.2 K/UL    ABS. EOSINOPHILS 0.2 0.0 - 0.4 K/UL    ABS. BASOPHILS 0.1 0.0 - 0.1 K/UL    DF AUTOMATED     METABOLIC PANEL, COMPREHENSIVE    Collection Time: 11/23/20 10:00 AM   Result Value Ref Range    Sodium 140 136 - 145 mmol/L    Potassium 3.7 3.5 - 5.5 mmol/L    Chloride 108 100 - 111 mmol/L    CO2 26 21 - 32 mmol/L    Anion gap 6 3.0 - 18 mmol/L    Glucose 87 74 - 99 mg/dL    BUN 23 (H) 7.0 - 18 MG/DL    Creatinine 1.25 0.6 - 1.3 MG/DL    BUN/Creatinine ratio 18 12 - 20      GFR est AA >60 >60 ml/min/1.73m2    GFR est non-AA >60 >60 ml/min/1.73m2    Calcium 9.0 8.5 - 10.1 MG/DL    Bilirubin, total 0.4 0.2 - 1.0 MG/DL    ALT (SGPT) 27 16 - 61 U/L    AST (SGOT) 16 10 - 38 U/L    Alk. phosphatase 71 45 - 117 U/L    Protein, total 8.1 6.4 - 8.2 g/dL    Albumin 4.0 3.4 - 5.0 g/dL    Globulin 4.1 (H) 2.0 - 4.0 g/dL    A-G Ratio 1.0 0.8 - 1.7         Radiologic Studies -   Xr Fingers Lt 2 Or More    Result Date: 11/23/2020  History: Pain. \"I dropped engine on my hand\". Patient presents with laceration to fourth digit. TECHNIQUE: 3 views left hand. COMPARISON: None. FINDINGS: No fracture or dislocation. Scapholunate and lunatotriquetral intervals preserved. Ulnar negative variance. No osseous coalition. Soft tissue deformity of the fourth digit noted with overlying bandage which limits evaluation. Potential small nondisplaced chip fracture along the radial and volar aspect of the fourth mid phalanx best characterized on oblique and frontal projections. IMPRESSION: Soft tissue deformity of the fourth digit, consistent with history of laceration. Potential small nondisplaced chip fracture along the renal/volar aspect of the fourth mid phalanx. Medical Decision Making   I am the first provider for this patient. I reviewed the vital signs, available nursing notes, past medical history, past surgical history, family history and social history.     Vital Signs-Reviewed the patient's vital signs. Pulse Oximetry Analysis -  94% on room air (Interpretation)    Records Reviewed: Nursing Notes and Old Medical Records (Time of Review: 10:13 AM)    ED Course: Progress Notes, Reevaluation, and Consults:    Provider Notes (Medical Decision Making):   Differential Diagnosis:  Laceration, avulsion, abrasion, puncture, skin tear, open fracture, contusion    Plan: Wound cleaned and closed primarily. X-ray reveals possible nondisplaced chip fracture to the mid phalanx of the left fourth finger. Tetanus updated. Patient treated here with morphine and 2 g of Ancef. Explained the importance of very prompt follow-up with hand surgery for repair of tendon laceration. Explained that this is extremely important for the functionality of left fourth finger and left hand as far as  strength goes. Patient states he completely understands, but cannot go through this (suturing/numbing) a second time. After a very lengthy discussion with patient, involving Dr. Pedro Pablo Woosd as well, patient now agrees that he will follow up with the hand surgeon as recommended. Dr. Pedro Pablo Woods has sent a perfect serve message to Dr. Art Edmondson, hand surgeon, regarding patient. Prior to discharge, reiterated the importance of prompt follow-up with hand surgery for tendon repair. Patient once again demonstrates understanding. At this time, patient is stable and appropriate for discharge home. Patient demonstrates understanding of current diagnoses and is in agreement with the treatment plan. They are advised that while the likelihood of serious underlying condition is low at this point given the evaluation performed today, we cannot fully rule it out. They are advised to immediately return with any new symptoms or worsening of current condition. All questions have been answered. Patient is given educational material regarding their diagnoses, including danger symptoms and when to return to the ED. Wound Repair    Date/Time: 11/23/2020 1:12 PM  Performed by: PAPreparation: skin prepped with Betadine and sterile field established  Pre-procedure re-eval: Immediately prior to the procedure, the patient was reevaluated and found suitable for the planned procedure and any planned medications. Location details: left ring finger  Wound length:2.6 - 7.5 cm  Anesthesia: local infiltration    Anesthesia:  Local Anesthetic: bupivacaine 0.5% without epinephrine  Anesthetic total: 5 mL  Foreign bodies: no foreign bodies  Irrigation solution: saline  Irrigation method: syringe  Debridement: minimal  Skin closure: 4-0 nylon  Number of sutures: 6  Technique: simple and interrupted  Approximation: close  Dressing: splint  My total time at bedside, performing this procedure was 16-30 minutes. Diagnosis     Clinical Impression:   1. Laceration of left ring finger without foreign body without damage to nail, initial encounter    2. Flexor tendon laceration of finger with open wound, initial encounter    3. Closed nondisplaced fracture of middle phalanx of left ring finger, initial encounter        Disposition: [place] Home    Follow-up Information     Follow up With Specialties Details Why Contact Shari BRUMFIELD DO Hand Surgery Call today to establish necessary follow-up as soon as possible regarding your tendon laceration [address] [place] [address] [address]      [facility] EMERGENCY DEPT Emergency Medicine Go to As needed, If symptoms worsen [address] [place] [id number]  [phone]           Discharge Medication List as of 11/23/2020  1:02 PM      START taking these medications    Details   cephALEXin (Keflex) 500 mg capsule Take 1 Cap by mouth four (4) times daily for 7 days. , Print, Disp-28 Cap,R-0         CONTINUE these medications which have NOT CHANGED    Details   predniSONE (STERAPRED DS) 10 mg dose pack SIG: Take 6 tabs day 1, Then 6 tabs day 2, Then 5 tabs day 3, Then 5 tabs day 4, Then 4 tabs day 5, Then 4 tabs day 6, Then 3 tabs day 7, Then 3 tabs day 8, Then 3 tabs day 9, Then 2 tabs day 10, Then 2 tabs day 11, Then 2 tabs day 12, Then 1 tab day 13,  Then 1 tab day 14, Then 1 tab day 15., Print, Disp-48 Tab, R-0      colchicine 0.6 mg tablet Take 1 tablet by mouth Q1Hour for gout pain. NOT TO EXCEED 3 TABLETS IN 24 HOURS., Print, Disp-30 Tab, R-0      indomethacin (INDOCIN) 25 mg capsule Take 2 Caps by mouth three (3) times daily. With food, Print, Disp-60 Cap, R-0      gabapentin (NEURONTIN) 100 mg capsule Take one at night for 3 nights then take 3 at night, Normal, Disp-60 Cap, R-1      amLODIPine (NORVASC) 5 mg tablet Take 1 Tab by mouth daily. , Normal, Disp-30 Tab, R-3      meloxicam (MOBIC) 7.5 mg tablet Take one twice a day, Normal, Disp-60 Tab, R-2      diazepam (VALIUM) 10 mg tablet Take 1 tablet by mouth every eight (8) hours as needed for Anxiety. , Print, Disp-20 tablet, R-0      cyclobenzaprine (FLEXERIL) 10 mg tablet Take 1 tablet by mouth three (3) times daily as needed for Muscle Spasm(s). , Print, Disp-20 tablet, R-0           _______________________________    This note was dictated utilizing voice recognition software which may lead to typographical errors. I apologize in advance if the situation occurs. If questions arise please do not hesitate to contact me or call our department.   Poppy Palacios PA-C

## 2020-11-24 RX ORDER — OXYCODONE AND ACETAMINOPHEN 5; 325 MG/1; MG/1
1 TABLET ORAL
Qty: 12 TAB | Refills: 0 | Status: SHIPPED | OUTPATIENT
Start: 2020-11-24 | End: 2020-11-27

## 2020-11-24 NOTE — CALL BACK NOTE
1:54 PM 
Called back and asking for pain medications. Very rude on the phone, agitated. Cannot be seen by ortho til after holiday. Sent in Rx for percocet.  - gualberto, bertrand

## 2021-04-24 RX ORDER — ZOSTER VACCINE RECOMBINANT, ADJUVANTED 50 MCG/0.5
0.5 KIT INTRAMUSCULAR ONCE
Qty: 0.5 ML | Refills: 0 | Status: CANCELLED | OUTPATIENT
Start: 2021-04-24 | End: 2021-04-24

## 2021-04-24 NOTE — PROGRESS NOTES
04/24/21    1. Bilateral foot pain  These are located in the ankle regions. I do not see any other joints that are involved. No pitting nails. Question if this is a limited form of psoriatic arthritis. Alternatively this could be degenerative joint disease. We will start him on Celebrex and will refer him to rheumatology for further evaluation. He declines x-rays and labs today.  - REFERRAL TO RHEUMATOLOGY  - celecoxib (CELEBREX) 100 mg capsule; Take 1 Cap by mouth two (2) times a day for 90 days. Dispense: 180 Cap; Refill: 3    2. Psoriasis  The patient declines creams. He wants more definitive treatment. I referred him to rheumatology and orthopedics again.  - REFERRAL TO ORTHOPEDICS    Our patient was quite agitated and did not allow for evaluation. I informed him of this. At 1 point he said he understood. He then stated \"you people do not understand\". From what I obtained on my exam I can tell he has depressed capacity to make his own decisions. lab results and schedule of future lab studies reviewed with patient  Diagnostic and radiologic results and the schedule of future studies were reviewed with the patient  We will try to see him again after he has been seen by the specialist.  I asked him to call if the Celebrex was not sufficient or if he had any side effects. Health Maintenance Due   Topic Date Due    Hepatitis C Screening  Never done    Pneumococcal 0-64 years (1 of 1 - PPSV23) Never done    COVID-19 Vaccine (1) Never done    DTaP/Tdap/Td series (1 - Tdap) Never done    Lipid Screen  Never done    Shingrix Vaccine Age 50> (1 of 2) Never done    Colorectal Cancer Screening Combo  Never done       Subjective: Lon Kenyon is a 48 y.o. male has Severe obesity ([address] on their problem list..  We last saw the patient in January 2019 for bilateral foot pain. He was placed on gabapentin and continued on Naprosyn.   He had polyarticular arthritis that was treated symptomatically. The patient had essential hypertension and Norvasc was initiated. The patient was a current smoker and strongly advised to cut down. .  The patient has psoriasis. He did not want the creams. He was to  referred to dermatology. The patient has right lower quadrant pain and a lab work-up was initiated. He had been seen in June 2019 for foot pain and on another visit leg pain. The patient was seen in the ED in November 2020 for a laceration of the finger. His most recent laboratory values are November 2020 showed a CBC that was reassuring and a CMP that was reassuring. Psoriasis  The patient has a chronic history of psoriasis. It is located on the arms, trunk and legs. He states he is also in the scalp. The quality is described as pruritic, he states. The area is also bleed easily. The patient states that he had not recently followed up with the dermatologist that he was referred to. He also states that he only saw the rheumatologist once and has not been back since the 2019 referrals. He does not state on questioning what the recommended treatment regimen was. Ankle/foot pain  The patient has pain in his feet on the dorsum. It is aggravated by walking. 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 This aggravates the symptoms. He states the Percocet is the only medication that he has found that will help. Tobacco use  The patient states that he continues to smoke and is not interested in cutting down at this time. Mental state  The patient was a nonlinear historian. He gave a lot of information, much of it not related to his health.   He stated that he thought that the medical system                 Current Outpatient Medications   Medication Sig    predniSONE (STERAPRED DS) 10 mg dose pack SIG: Take 6 tabs day 1, Then 6 tabs day 2, Then 5 tabs day 3, Then 5 tabs day 4, Then 4 tabs day 5, Then 4 tabs day 6, Then 3 tabs day 7, Then 3 tabs day 8, Then 3 tabs day 9, Then 2 tabs day 10, Then 2 tabs day 11, Then 2 tabs day 12, Then 1 tab day 13, Then 1 tab day 14, Then 1 tab day 15.  colchicine 0.6 mg tablet Take 1 tablet by mouth Q1Hour for gout pain. NOT TO EXCEED 3 TABLETS IN 24 HOURS.  indomethacin (INDOCIN) 25 mg capsule Take 2 Caps by mouth three (3) times daily. With food    gabapentin (NEURONTIN) 100 mg capsule Take one at night for 3 nights then take 3 at night    amLODIPine (NORVASC) 5 mg tablet Take 1 Tab by mouth daily.  meloxicam (MOBIC) 7.5 mg tablet Take one twice a day    diazepam (VALIUM) 10 mg tablet Take 1 tablet by mouth every eight (8) hours as needed for Anxiety.  cyclobenzaprine (FLEXERIL) 10 mg tablet Take 1 tablet by mouth three (3) times daily as needed for Muscle Spasm(s). No current facility-administered medications for this visit. No Known Allergies  has Severe obesity (HCC) on their problem list.    No past surgical history on file. reports that he has been smoking. He has been smoking about 0.25 packs per day. He has never used smokeless tobacco. He reports current alcohol use. He reports current drug use. Drug: Marijuana. family history is not on file. Review of Systems   Constitutional: Negative for fever and weight loss. HENT: Negative for congestion. Eyes: Positive for blurred vision. Respiratory: Negative for shortness of breath. Cardiovascular: Negative for chest pain and claudication. Gastrointestinal: Negative for constipation and diarrhea. Musculoskeletal: Positive for joint pain (Bilateral feet and ankles. ). Skin: Positive for rash. Diffuse psoriasis. No nail pitting. Psychiatric/Behavioral: Negative for substance abuse and suicidal ideas. The patient is nervous/anxious. The patient had constant speech. He denied any anxiety except \"what you people are causing me\". He denied any suicidal ideation. He denied being any risk of harm to others.   When I ask if he had ever been evaluated for ADHD, he became irate. He talked over any other questions about anxiety, depression, bipolar disorder, not allowing the questions to be asked fully. Visit Vitals  BP (!) 142/85 (BP 1 Location: Left arm, BP Patient Position: Sitting, BP Cuff Size: Adult)   Pulse 81   Temp 97.9 °F (36.6 °C) (Oral)   Resp 16   Ht 5' 9\" (1.753 m)   Wt 226 lb (102.5 kg)   SpO2 95%   BMI 33.37 kg/m²       Physical Exam  Vitals signs and nursing note reviewed. Constitutional:       Appearance: Normal appearance. HENT:      Right Ear: Tympanic membrane normal.      Left Ear: Tympanic membrane normal.      Nose: Nose normal.   Eyes:      General: No scleral icterus. Pupils: Pupils are equal, round, and reactive to light. Neck:      Musculoskeletal: No neck rigidity. Cardiovascular:      Rate and Rhythm: Normal rate and regular rhythm. Pulses: Normal pulses. Heart sounds: No murmur. No friction rub. No gallop. Comments: Continued to talk during the exam when asked to hold the conversation. Pulmonary:      Effort: Pulmonary effort is normal. No respiratory distress. Comments: Talking  Abdominal:      General: Abdomen is flat. There is no distension. Musculoskeletal:         General: No swelling or deformity. Right lower leg: No edema. Left lower leg: No edema. Comments: Scoliosis. There is no tenderness when ambulating. He does walk with a slight limp bilaterally. Skin:     Comments: Multiple psoriasiform plaques on the extremities and trunk   Neurological:      General: No focal deficit present. Mental Status: He is alert. Cranial Nerves: No cranial nerve deficit. Sensory: No sensory deficit. Comments: Declines to answer orientation questions initially but during the conversation did not he was at a physician's office, mention the month, mention the city, remembered the name of the the prior consultants. Psychiatric:      Comments: , Appeared irate and angry. Cooperation was partial.  Frequently did not allow questions. Results for orders placed or performed during the hospital encounter of 11/23/20   CBC WITH AUTOMATED DIFF   Result Value Ref Range    WBC 8.7 4.6 - 13.2 K/uL    RBC 4.77 4.70 - 5.50 M/uL    HGB 15.5 13.0 - 16.0 g/dL    HCT 46.3 36.0 - 48.0 %    MCV 97.1 (H) 74.0 - 97.0 FL    MCH 32.5 24.0 - 34.0 PG    MCHC 33.5 31.0 - 37.0 g/dL    RDW 12.8 11.6 - 14.5 %    PLATELET 526 892 - 995 K/uL    MPV 10.7 9.2 - 11.8 FL    NEUTROPHILS 61 40 - 73 %    LYMPHOCYTES 24 21 - 52 %    MONOCYTES 12 (H) 3 - 10 %    EOSINOPHILS 2 0 - 5 %    BASOPHILS 1 0 - 2 %    ABS. NEUTROPHILS 5.3 1.8 - 8.0 K/UL    ABS. LYMPHOCYTES 2.1 0.9 - 3.6 K/UL    ABS. MONOCYTES 1.1 0.05 - 1.2 K/UL    ABS. EOSINOPHILS 0.2 0.0 - 0.4 K/UL    ABS. BASOPHILS 0.1 0.0 - 0.1 K/UL    DF AUTOMATED     METABOLIC PANEL, COMPREHENSIVE   Result Value Ref Range    Sodium 140 136 - 145 mmol/L    Potassium 3.7 3.5 - 5.5 mmol/L    Chloride 108 100 - 111 mmol/L    CO2 26 21 - 32 mmol/L    Anion gap 6 3.0 - 18 mmol/L    Glucose 87 74 - 99 mg/dL    BUN 23 (H) 7.0 - 18 MG/DL    Creatinine 1.25 0.6 - 1.3 MG/DL    BUN/Creatinine ratio 18 12 - 20      GFR est AA >60 >60 ml/min/1.73m2    GFR est non-AA >60 >60 ml/min/1.73m2    Calcium 9.0 8.5 - 10.1 MG/DL    Bilirubin, total 0.4 0.2 - 1.0 MG/DL    ALT (SGPT) 27 16 - 61 U/L    AST (SGOT) 16 10 - 38 U/L    Alk. phosphatase 71 45 - 117 U/L    Protein, total 8.1 6.4 - 8.2 g/dL    Albumin 4.0 3.4 - 5.0 g/dL    Globulin 4.1 (H) 2.0 - 4.0 g/dL    A-G Ratio 1.0 0.8 - 1.7           XR Results (maximum last 3): Results from [place] encounter on 11/23/20   XR FINGERS LT 2 OR MORE    Impression IMPRESSION:    Soft tissue deformity of the fourth digit, consistent with history of  laceration. Potential small nondisplaced chip fracture along the renal/volar  aspect of the fourth mid phalanx.    Results from [place] encounter on 04/22/19   XR ANKLE LT MIN 3 V Impression IMPRESSION:      1. No fracture or acute malalignment involving the left ankle. 2. Mild lateral malleolar soft tissue swelling without retained radiopaque  foreign object. We discussed the expected course, resolution and complications of the diagnosis(es) in detail. Medication risks, benefits, costs, interactions, and alternatives were discussed as indicated. I advised him to contact the office if his condition worsens, changes or fails to improve as anticipated. He expressed understanding with the diagnosis(es) and plan. This note was done with the assistance of dragon speech software.   Some inadvertent errors or omissions may be present

## 2021-04-26 ENCOUNTER — OFFICE VISIT (OUTPATIENT)
Dept: FAMILY MEDICINE CLINIC | Age: 51
End: 2021-04-26
Payer: MEDICAID

## 2021-04-26 VITALS
SYSTOLIC BLOOD PRESSURE: 142 MMHG | DIASTOLIC BLOOD PRESSURE: 85 MMHG | BODY MASS INDEX: 33.47 KG/M2 | RESPIRATION RATE: 16 BRPM | HEART RATE: 81 BPM | HEIGHT: 69 IN | OXYGEN SATURATION: 95 % | WEIGHT: 226 LBS | TEMPERATURE: 97.9 F

## 2021-04-26 DIAGNOSIS — M79.671 BILATERAL FOOT PAIN: Primary | ICD-10-CM

## 2021-04-26 DIAGNOSIS — L40.9 PSORIASIS: ICD-10-CM

## 2021-04-26 DIAGNOSIS — M79.672 BILATERAL FOOT PAIN: Primary | ICD-10-CM

## 2021-04-26 PROCEDURE — 99213 OFFICE O/P EST LOW 20 MIN: CPT | Performed by: EMERGENCY MEDICINE

## 2021-04-26 RX ORDER — CELECOXIB 100 MG/1
100 CAPSULE ORAL 2 TIMES DAILY
Qty: 180 CAP | Refills: 3 | Status: SHIPPED | OUTPATIENT
Start: 2021-04-26 | End: 2021-07-25

## 2021-04-26 RX ORDER — CELECOXIB 100 MG/1
100 CAPSULE ORAL 2 TIMES DAILY
Qty: 180 CAP | Refills: 3 | Status: SHIPPED | OUTPATIENT
Start: 2021-04-26 | End: 2021-04-26

## 2021-04-26 NOTE — PROGRESS NOTES
Maicol Madrid is a 48 y.o. male that is here for a   Chief Complaint   Patient presents with    Foot Pain     Left and right foot pain pt states \" that he has been taking 15 to 20 Naproxen a day \" pt states \" that we can't walk at times and he is tired of wasting his time not getting any help\"     Swelling     foot swelling          1. Have you been to the ER, urgent care clinic since your last visit? Hospitalized since your last visit? yes    2. Have you seen or consulted any other health care providers outside of the [address] [place] [address] since your last visit? Include any pap smears or colon screening. no      Health Maintenance reviewed - yes.       Upcoming Appts  no      VORB: No orders of the defined types were placed in this encounter.   Joy Brasher MD/ Jessica Saba MA

## 2021-04-30 ENCOUNTER — TELEPHONE (OUTPATIENT)
Dept: FAMILY MEDICINE CLINIC | Age: 51
End: 2021-04-30

## 2021-04-30 NOTE — TELEPHONE ENCOUNTER
Patient called about medication that was called in on 04/26/21  by Dr Elio Daugherty he stated that he needs a emelina auth please advise

## 2022-09-16 ENCOUNTER — HOSPITAL ENCOUNTER (EMERGENCY)
Age: 52
Discharge: HOME | End: 2022-09-16
Attending: EMERGENCY MEDICINE
Payer: MEDICAID

## 2022-09-16 ENCOUNTER — APPOINTMENT (OUTPATIENT)
Dept: GENERAL RADIOLOGY | Age: 52
End: 2022-09-16
Attending: EMERGENCY MEDICINE
Payer: MEDICAID

## 2022-09-16 VITALS
DIASTOLIC BLOOD PRESSURE: 90 MMHG | HEART RATE: 64 BPM | TEMPERATURE: 98.3 F | SYSTOLIC BLOOD PRESSURE: 145 MMHG | OXYGEN SATURATION: 96 % | WEIGHT: 240 LBS | HEIGHT: 69 IN | RESPIRATION RATE: 20 BRPM | BODY MASS INDEX: 35.55 KG/M2

## 2022-09-16 DIAGNOSIS — R07.89 OTHER CHEST PAIN: Primary | ICD-10-CM

## 2022-09-16 LAB
ALBUMIN SERPL-MCNC: 3.8 G/DL (ref 3.4–5)
ALBUMIN/GLOB SERPL: 1 {RATIO} (ref 0.8–1.7)
ALP SERPL-CCNC: 67 U/L (ref 45–117)
ALT SERPL-CCNC: 48 U/L (ref 16–61)
ANION GAP SERPL CALC-SCNC: 4 MMOL/L (ref 3–18)
AST SERPL-CCNC: 27 U/L (ref 10–38)
ATRIAL RATE: 74 BPM
BASOPHILS # BLD: 0 K/UL (ref 0–0.1)
BASOPHILS NFR BLD: 1 % (ref 0–2)
BILIRUB SERPL-MCNC: 0.3 MG/DL (ref 0.2–1)
BUN SERPL-MCNC: 16 MG/DL (ref 7–18)
BUN/CREAT SERPL: 15 (ref 12–20)
CALCIUM SERPL-MCNC: 9.5 MG/DL (ref 8.5–10.1)
CALCULATED P AXIS, ECG09: 33 DEGREES
CALCULATED R AXIS, ECG10: 81 DEGREES
CALCULATED T AXIS, ECG11: 27 DEGREES
CHLORIDE SERPL-SCNC: 107 MMOL/L (ref 100–111)
CO2 SERPL-SCNC: 27 MMOL/L (ref 21–32)
CREAT SERPL-MCNC: 1.08 MG/DL (ref 0.6–1.3)
DIAGNOSIS, 93000: NORMAL
DIFFERENTIAL METHOD BLD: ABNORMAL
EOSINOPHIL # BLD: 0.2 K/UL (ref 0–0.4)
EOSINOPHIL NFR BLD: 3 % (ref 0–5)
ERYTHROCYTE [DISTWIDTH] IN BLOOD BY AUTOMATED COUNT: 12.6 % (ref 11.6–14.5)
GLOBULIN SER CALC-MCNC: 3.9 G/DL (ref 2–4)
GLUCOSE SERPL-MCNC: 104 MG/DL (ref 74–99)
HCT VFR BLD AUTO: 48.1 % (ref 36–48)
HGB BLD-MCNC: 16.1 G/DL (ref 13–16)
IMM GRANULOCYTES # BLD AUTO: 0 K/UL (ref 0–0.04)
IMM GRANULOCYTES NFR BLD AUTO: 1 % (ref 0–0.5)
LIPASE SERPL-CCNC: 159 U/L (ref 73–393)
LYMPHOCYTES # BLD: 3.2 K/UL (ref 0.9–3.6)
LYMPHOCYTES NFR BLD: 39 % (ref 21–52)
MCH RBC QN AUTO: 32.4 PG (ref 24–34)
MCHC RBC AUTO-ENTMCNC: 33.5 G/DL (ref 31–37)
MCV RBC AUTO: 96.8 FL (ref 78–100)
MONOCYTES # BLD: 0.8 K/UL (ref 0.05–1.2)
MONOCYTES NFR BLD: 10 % (ref 3–10)
NEUTS SEG # BLD: 4.1 K/UL (ref 1.8–8)
NEUTS SEG NFR BLD: 48 % (ref 40–73)
NRBC # BLD: 0 K/UL (ref 0–0.01)
NRBC BLD-RTO: 0 PER 100 WBC
P-R INTERVAL, ECG05: 150 MS
PLATELET # BLD AUTO: 258 K/UL (ref 135–420)
PMV BLD AUTO: 9.5 FL (ref 9.2–11.8)
POTASSIUM SERPL-SCNC: 4.3 MMOL/L (ref 3.5–5.5)
PROT SERPL-MCNC: 7.7 G/DL (ref 6.4–8.2)
Q-T INTERVAL, ECG07: 394 MS
QRS DURATION, ECG06: 88 MS
QTC CALCULATION (BEZET), ECG08: 437 MS
RBC # BLD AUTO: 4.97 M/UL (ref 4.35–5.65)
SODIUM SERPL-SCNC: 138 MMOL/L (ref 136–145)
TROPONIN-HIGH SENSITIVITY: 6 NG/L (ref 0–78)
VENTRICULAR RATE, ECG03: 74 BPM
WBC # BLD AUTO: 8.4 K/UL (ref 4.6–13.2)

## 2022-09-16 PROCEDURE — 71046 X-RAY EXAM CHEST 2 VIEWS: CPT

## 2022-09-16 PROCEDURE — 93005 ELECTROCARDIOGRAM TRACING: CPT

## 2022-09-16 PROCEDURE — 83690 ASSAY OF LIPASE: CPT

## 2022-09-16 PROCEDURE — 85025 COMPLETE CBC W/AUTO DIFF WBC: CPT

## 2022-09-16 PROCEDURE — 84484 ASSAY OF TROPONIN QUANT: CPT

## 2022-09-16 PROCEDURE — 99285 EMERGENCY DEPT VISIT HI MDM: CPT

## 2022-09-16 PROCEDURE — 80053 COMPREHEN METABOLIC PANEL: CPT

## 2022-09-16 RX ORDER — PREDNISONE 20 MG/1
40 TABLET ORAL DAILY
Qty: 10 TABLET | Refills: 0 | Status: SHIPPED | OUTPATIENT
Start: 2022-09-16 | End: 2022-09-20

## 2022-09-16 RX ORDER — GUAIFENESIN 100 MG/5ML
324 LIQUID (ML) ORAL
Status: DISCONTINUED | OUTPATIENT
Start: 2022-09-16 | End: 2022-09-16 | Stop reason: HOSPADM

## 2022-09-16 RX ORDER — METHOCARBAMOL 750 MG/1
750 TABLET, FILM COATED ORAL
Qty: 20 TABLET | Refills: 0 | Status: SHIPPED | OUTPATIENT
Start: 2022-09-16 | End: 2022-09-20

## 2022-09-16 RX ORDER — NAPROXEN 500 MG/1
500 TABLET ORAL 2 TIMES DAILY WITH MEALS
Qty: 20 TABLET | Refills: 0 | Status: SHIPPED | OUTPATIENT
Start: 2022-09-16 | End: 2022-09-20

## 2022-09-16 RX ORDER — LIDOCAINE 4 G/100G
PATCH TOPICAL
Qty: 20 PATCH | Refills: 0 | Status: SHIPPED | OUTPATIENT
Start: 2022-09-16 | End: 2022-09-20

## 2022-09-16 NOTE — ED PROVIDER NOTES
EMERGENCY DEPARTMENT HISTORY AND PHYSICAL EXAM    8:58 AM      Date: 9/16/2022  Patient Name: Sherice Sharma    History of Presenting Illness     Chief Complaint   Patient presents with    Shortness of Breath    Rib Pain         History Provided By: Patient  Location/Duration/Severity/Modifying factors   HPI  This is a very pleasant 79-year-old male brought to the emergency department for evaluation of 3 months of left-sided rib pain. Patient reports been usual state of health until over 3 months ago when symptoms restarted. He says the pain is noted in his left side of his ribs and radiates towards his right side. No reported fevers no new swelling of legs or feet no history of DVTs or PEs and no trauma. Twisting turning bending or exertion worsens the symptoms Naprosyn and rest seems to improve his symptoms. He reports that he is taking significant mount Naprosyn every day. He is a smoker. Denies any recent weight loss or weight gain and no trauma. Patient presents to the emergency department today due to worsening of symptoms. PCP: Abelino De La Torre MD    Current Facility-Administered Medications   Medication Dose Route Frequency Provider Last Rate Last Admin    aspirin chewable tablet 324 mg  324 mg Oral NOW Waldo Schlatter, MD         Current Outpatient Medications   Medication Sig Dispense Refill    methocarbamoL (ROBAXIN) 750 mg tablet Take 1 Tablet by mouth three (3) times daily as needed for Muscle Spasm(s) (muscle spasm) for up to 20 doses. 20 Tablet 0    naproxen (Naprosyn) 500 mg tablet Take 1 Tablet by mouth two (2) times daily (with meals) for 10 days. 20 Tablet 0    lidocaine 4 % patch Apply patch to area of pain every 24 hours as needed 20 Patch 0    predniSONE (DELTASONE) 20 mg tablet Take 2 Tablets by mouth daily for 5 days.  With Breakfast 10 Tablet 0    predniSONE (STERAPRED DS) 10 mg dose pack SIG: Take 6 tabs day 1, Then 6 tabs day 2, Then 5 tabs day 3, Then 5 tabs day 4, Then 4 tabs day 5, Then 4 tabs day 6, Then 3 tabs day 7, Then 3 tabs day 8, Then 3 tabs day 9, Then 2 tabs day 10, Then 2 tabs day 11, Then 2 tabs day 12, Then 1 tab day 13, Then 1 tab day 14, Then 1 tab day 15. 48 Tab 0    colchicine 0.6 mg tablet Take 1 tablet by mouth Q1Hour for gout pain. NOT TO EXCEED 3 TABLETS IN 24 HOURS. 30 Tab 0    gabapentin (NEURONTIN) 100 mg capsule Take one at night for 3 nights then take 3 at night 60 Cap 1    amLODIPine (NORVASC) 5 mg tablet Take 1 Tab by mouth daily. 30 Tab 3    diazepam (VALIUM) 10 mg tablet Take 1 tablet by mouth every eight (8) hours as needed for Anxiety. 20 tablet 0    cyclobenzaprine (FLEXERIL) 10 mg tablet Take 1 tablet by mouth three (3) times daily as needed for Muscle Spasm(s). 20 tablet 0       Past History     Past Medical History:  Past Medical History:   Diagnosis Date    Arthritis        Past Surgical History:  No past surgical history on file. Family History:  No family history on file. Social History:  Social History     Tobacco Use    Smoking status: Every Day     Packs/day: 0.25     Types: Cigarettes    Smokeless tobacco: Never   Substance Use Topics    Alcohol use: Yes     Comment: Pt states, \"Occasionally, one day a week\"     Drug use: Yes     Types: Marijuana     Comment: Last smoked marijuana today        Allergies:  No Known Allergies      Review of Systems       Review of Systems  At least 10 systems reviewed and otherwise acutely negative except as in the [address] Physical Exam   Visit Vitals  BP (!) 145/90   Pulse 64   Temp 98.3 °F (36.8 °C)   Resp 20   Ht 5' 9\" (1.753 m)   Wt 108.9 kg (240 lb)   SpO2 96%   BMI 35.44 kg/m²         Physical Exam  General: Well-appearing well-nourished in no acute distress  HEENT: Pupils equal round reactive light, moist mucous membranes.   Extraocular movements intact  Neck: Supple, no meningismus  Chest: Regular rate rhythm no murmurs rubs or gallops  Abdomen: Soft nontender nondistended no tenderness in right upper quadrant no pulsatile masses to deep palpation  Lungs: Clear to auscultation bilaterally no wheeze rales rhonchi or stridor  Extremities: No unilateral leg swelling no obvious deformities or dislocations  Integument: No apparent rashes erythema contusions or petechiae  Neurologic: Cranial nerves II through XII grossly intact  Psychiatric: Alert oriented x3    Diagnostic Study Results     Labs -  Recent Results (from the past 12 hour(s))   CBC WITH AUTOMATED DIFF    Collection Time: 09/16/22  8:55 AM   Result Value Ref Range    WBC 8.4 4.6 - 13.2 K/uL    RBC 4.97 4.35 - 5.65 M/uL    HGB 16.1 (H) 13.0 - 16.0 g/dL    HCT 48.1 (H) 36.0 - 48.0 %    MCV 96.8 78.0 - 100.0 FL    MCH 32.4 24.0 - 34.0 PG    MCHC 33.5 31.0 - 37.0 g/dL    RDW 12.6 11.6 - 14.5 %    PLATELET 446 341 - 436 K/uL    MPV 9.5 9.2 - 11.8 FL    NRBC 0.0 0  WBC    ABSOLUTE NRBC 0.00 0.00 - 0.01 K/uL    NEUTROPHILS 48 40 - 73 %    LYMPHOCYTES 39 21 - 52 %    MONOCYTES 10 3 - 10 %    EOSINOPHILS 3 0 - 5 %    BASOPHILS 1 0 - 2 %    IMMATURE GRANULOCYTES 1 (H) 0.0 - 0.5 %    ABS. NEUTROPHILS 4.1 1.8 - 8.0 K/UL    ABS. LYMPHOCYTES 3.2 0.9 - 3.6 K/UL    ABS. MONOCYTES 0.8 0.05 - 1.2 K/UL    ABS. EOSINOPHILS 0.2 0.0 - 0.4 K/UL    ABS. BASOPHILS 0.0 0.0 - 0.1 K/UL    ABS. IMM. GRANS. 0.0 0.00 - 0.04 K/UL    DF AUTOMATED     METABOLIC PANEL, COMPREHENSIVE    Collection Time: 09/16/22  8:55 AM   Result Value Ref Range    Sodium 138 136 - 145 mmol/L    Potassium 4.3 3.5 - 5.5 mmol/L    Chloride 107 100 - 111 mmol/L    CO2 27 21 - 32 mmol/L    Anion gap 4 3.0 - 18 mmol/L    Glucose 104 (H) 74 - 99 mg/dL    BUN 16 7.0 - 18 MG/DL    Creatinine 1.08 0.6 - 1.3 MG/DL    BUN/Creatinine ratio 15 12 - 20      GFR est AA >60 >60 ml/min/1.73m2    GFR est non-AA >60 >60 ml/min/1.73m2    Calcium 9.5 8.5 - 10.1 MG/DL    Bilirubin, total 0.3 0.2 - 1.0 MG/DL    ALT (SGPT) 48 16 - 61 U/L    AST (SGOT) 27 10 - 38 U/L    Alk.  phosphatase 67 45 - 117 U/L    Protein, total 7.7 6.4 - 8.2 g/dL    Albumin 3.8 3.4 - 5.0 g/dL    Globulin 3.9 2.0 - 4.0 g/dL    A-G Ratio 1.0 0.8 - 1.7     TROPONIN-HIGH SENSITIVITY    Collection Time: 09/16/22  8:55 AM   Result Value Ref Range    Troponin-High Sensitivity 6 0 - 78 ng/L   LIPASE    Collection Time: 09/16/22  8:55 AM   Result Value Ref Range    Lipase 159 73 - 393 U/L       Radiologic Studies -   XR CHEST PA LAT   Final Result   Low lung volumes. Mild bibasilar interstitial opacities, favor atelectasis   versus less likely infiltrates. Medical Decision Making   I am the first provider for this patient. I reviewed the vital signs, available nursing notes, past medical history, past surgical history, family history and social history. Vital Signs-Reviewed the patient's vital signs. EKG:   Patient's EKG is interpreted by me sinus rhythm rate 74   no ST elevation no ST depression I appreciate no acute ischemia or infarction on this EKG incomplete right bundle branch block is noted no old EKGs in the EMR with which to compare    Records Reviewed: Nursing Notes and Old Medical Records (Time of Review: 8:58 AM)    ED Course: Progress Notes, Reevaluation, and Consults:         Provider Notes (Medical Decision Making):   MDM  This is a 70-year-old male brought to the emergency department for evaluation of 3 months of left rib pain. Based on patient's history and physical would be concerned about possible cancers. Other possibilities do include rib fractures, infectious etiology atypical cardiac disease or even musculoskeletal in origin. Lower clinical suspicion for acute pulmonary embolus as patient is not tachycardic tachypneic nor hypoxemic and symptoms of an ongoing constantly for the past 3 months. No tenderness behind either one of his calves. Patient denies trauma. Likewise acute coronary syndrome less likely given time course of patient's symptoms.     I did review patient's imaging studies and laboratory work as noted above. On reevaluation patient states that he wishes to stay in the emergency department for a CT scan for closer evaluation of his left ribs. He is requesting to be discharged home at this time. He wants a referral to pain management that takes his insurance he wants a referral to rheumatology he wants a prescription for enough pain medications as can last him several months until he can get into pain management and he wants to be discharged home. Advised him I will be happy to do what I can for him. Patient will be discharged with prescriptions for Naprosyn, Medrol Dosepak, lidocaine patches and Robaxin. Sedation warnings were provided for Robaxin. Patient was advised that we do not have exact cause of his left-sided rib pain that has been ongoing for 3 months we cannot rule out cardiac cause of his symptoms or other life-threatening conditions. Recommend close follow-up with primary care physician or referral physician next 24 to 48 hours, return precautions were discussed with him including but not limited to recurrent chest pain, shortness of breath, syncope, weakness, any other concerning symptoms he did express a verbal understanding of these instructions      Procedures    Critical Care Time:         Diagnosis     Clinical Impression:   1. Other chest pain        Disposition:       Follow-up Information       Follow up With Specialties Details Why Contact Info    Author MD [facility] Schedule an appointment as soon as possible for a visit in 2 days  [phone]               Patient's Medications   Start Taking    LIDOCAINE 4 % PATCH    Apply patch to area of pain every 24 hours as needed    METHOCARBAMOL (ROBAXIN) 750 MG TABLET    Take 1 Tablet by mouth three (3) times daily as needed for Muscle Spasm(s) (muscle spasm) for up to 20 doses.     NAPROXEN (NAPROSYN) 500 MG TABLET    Take 1 Tablet by mouth two (2) times daily (with meals) for 10 days. PREDNISONE (DELTASONE) 20 MG TABLET    Take 2 Tablets by mouth daily for 5 days. With Breakfast   Continue Taking    AMLODIPINE (NORVASC) 5 MG TABLET    Take 1 Tab by mouth daily. COLCHICINE 0.6 MG TABLET    Take 1 tablet by mouth Q1Hour for gout pain. NOT TO EXCEED 3 TABLETS IN 24 HOURS. CYCLOBENZAPRINE (FLEXERIL) 10 MG TABLET    Take 1 tablet by mouth three (3) times daily as needed for Muscle Spasm(s). DIAZEPAM (VALIUM) 10 MG TABLET    Take 1 tablet by mouth every eight (8) hours as needed for Anxiety. GABAPENTIN (NEURONTIN) 100 MG CAPSULE    Take one at night for 3 nights then take 3 at night    PREDNISONE (STERAPRED DS) 10 MG DOSE PACK    SIG: Take 6 tabs day 1, Then 6 tabs day 2, Then 5 tabs day 3, Then 5 tabs day 4, Then 4 tabs day 5, Then 4 tabs day 6, Then 3 tabs day 7, Then 3 tabs day 8, Then 3 tabs day 9, Then 2 tabs day 10, Then 2 tabs day 11, Then 2 tabs day 12, Then 1 tab day 13, Then 1 tab day 14, Then 1 tab day 15. These Medications have changed    No medications on file   Stop Taking    No medications on file     Disclaimer: Sections of this note are dictated using utilizing voice recognition software. Minor typographical errors may be present. If questions arise, please do not hesitate to contact me or call our department.

## 2022-09-16 NOTE — Clinical Note
[address] [place] [address] Dr [facility] EMERGENCY DEPT  [address] [address] [phone]    Work/School Note    Date: 9/16/2022    To Whom It May concern:    Antolin oDminguez was seen and treated today in the emergency room by the following provider(s):  Attending Provider: Sulma Soto MD.      Mel Moffett is excused from work/school on 09/16/22 and 09/17/22. He is medically clear to return to work/school on 9/18/2022.        Sincerely,          Candido Stevenson MD

## 2022-09-16 NOTE — ED TRIAGE NOTES
Client reports having pain in intermittent l. Upper side pain over 3 months, with r. Upper side p ain over past month. Client also reports SOB today, unable to sleep. Client also has  arthritis with swelling, noted swelling to hands.

## 2022-09-16 NOTE — DISCHARGE INSTRUCTIONS
Please follow-up with primary care physician or referral physician next 24 to 48 hours. Call to schedule appointment    Return to the emergency department for recurrent chest pain, shortness of breath, syncope, weakness, any other concerning symptoms    Vaccine as needed for muscle spasm. Do not drive, drink alcohol, operate machinery while taking this medication. It will impair your judgment and is habit-forming.

## 2022-09-20 RX ORDER — PREDNISONE 20 MG/1
40 TABLET ORAL DAILY
Qty: 10 TABLET | Refills: 0 | Status: SHIPPED | OUTPATIENT
Start: 2022-09-20 | End: 2022-09-25

## 2022-09-20 RX ORDER — NAPROXEN 500 MG/1
500 TABLET ORAL 2 TIMES DAILY WITH MEALS
Qty: 20 TABLET | Refills: 0 | Status: SHIPPED | OUTPATIENT
Start: 2022-09-20 | End: 2022-09-30

## 2022-09-20 RX ORDER — LIDOCAINE 4 G/100G
PATCH TOPICAL
Qty: 20 PATCH | Refills: 0 | Status: SHIPPED | OUTPATIENT
Start: 2022-09-20

## 2022-09-20 RX ORDER — METHOCARBAMOL 750 MG/1
750 TABLET, FILM COATED ORAL
Qty: 20 TABLET | Refills: 0 | Status: SHIPPED | OUTPATIENT
Start: 2022-09-20

## 2022-09-20 NOTE — CALL BACK NOTE
11:22 AM  Pt called, very agitated. Says he told physician who wrote Rx that his pharmacy was different than prescribed to. Have changed to update his preference. 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 Several minutes spent on phone listening to patient who continued to vent his disappointment.   Thanked him and sent Rx. - bertrand burciaga